# Patient Record
Sex: FEMALE
[De-identification: names, ages, dates, MRNs, and addresses within clinical notes are randomized per-mention and may not be internally consistent; named-entity substitution may affect disease eponyms.]

---

## 2017-02-02 ENCOUNTER — CHARTING TRANS (OUTPATIENT)
Dept: OTHER | Age: 47
End: 2017-02-02

## 2017-02-05 ENCOUNTER — CHARTING TRANS (OUTPATIENT)
Dept: OTHER | Age: 47
End: 2017-02-05

## 2017-11-08 ENCOUNTER — CHARTING TRANS (OUTPATIENT)
Dept: OTHER | Age: 47
End: 2017-11-08

## 2018-11-02 VITALS
OXYGEN SATURATION: 98 % | WEIGHT: 160 LBS | RESPIRATION RATE: 16 BRPM | SYSTOLIC BLOOD PRESSURE: 100 MMHG | DIASTOLIC BLOOD PRESSURE: 68 MMHG | HEIGHT: 65 IN | BODY MASS INDEX: 26.66 KG/M2 | HEART RATE: 68 BPM | TEMPERATURE: 98 F

## 2021-02-01 ENCOUNTER — WALK IN (OUTPATIENT)
Dept: URGENT CARE | Age: 51
End: 2021-02-01

## 2021-02-01 VITALS
TEMPERATURE: 96.2 F | SYSTOLIC BLOOD PRESSURE: 108 MMHG | DIASTOLIC BLOOD PRESSURE: 76 MMHG | WEIGHT: 164.79 LBS | HEART RATE: 76 BPM | BODY MASS INDEX: 27.46 KG/M2 | HEIGHT: 65 IN

## 2021-02-01 DIAGNOSIS — Z02.1 PHYSICAL EXAM, PRE-EMPLOYMENT: Primary | ICD-10-CM

## 2021-02-01 PROCEDURE — X0945 SELF PAY APN OR PA PERFORMED ADMINISTRATIVE PHYSICAL: HCPCS | Performed by: NURSE PRACTITIONER

## 2021-02-01 ASSESSMENT — ENCOUNTER SYMPTOMS
WHEEZING: 0
SHORTNESS OF BREATH: 0
HEADACHES: 0
EYE DISCHARGE: 0
FATIGUE: 0
DIARRHEA: 0
RHINORRHEA: 0
ABDOMINAL PAIN: 0
EYE PAIN: 0
DIAPHORESIS: 0
PSYCHIATRIC NEGATIVE: 1
SORE THROAT: 0
FEVER: 0
DIZZINESS: 0
CHEST TIGHTNESS: 0
WOUND: 0
COUGH: 0
CHILLS: 0
SEIZURES: 0
EYE ITCHING: 0
VOMITING: 0
SINUS PRESSURE: 0
NAUSEA: 0

## 2021-02-01 ASSESSMENT — VISUAL ACUITY: OU: 1

## 2021-03-09 ENCOUNTER — WALK IN (OUTPATIENT)
Dept: URGENT CARE | Age: 51
End: 2021-03-09

## 2021-03-09 VITALS — TEMPERATURE: 98.2 F

## 2021-03-09 DIAGNOSIS — Z11.1 SCREENING FOR TUBERCULOSIS: Primary | ICD-10-CM

## 2021-03-09 PROCEDURE — 86580 TB INTRADERMAL TEST: CPT | Performed by: NURSE PRACTITIONER

## 2021-03-11 ENCOUNTER — WALK IN (OUTPATIENT)
Dept: URGENT CARE | Age: 51
End: 2021-03-11

## 2021-03-11 DIAGNOSIS — Z11.1 ENCOUNTER FOR PPD SKIN TEST READING: Primary | ICD-10-CM

## 2021-03-11 LAB
INDURATION: 0 MM (ref 0–?)
SKIN TEST RESULT: NEGATIVE

## 2021-03-11 PROCEDURE — X1094 NO CHARGE VISIT: HCPCS | Performed by: NURSE PRACTITIONER

## 2022-11-09 ENCOUNTER — OFFICE VISIT (OUTPATIENT)
Dept: RHEUMATOLOGY | Facility: CLINIC | Age: 52
End: 2022-11-09
Payer: COMMERCIAL

## 2022-11-09 ENCOUNTER — LAB ENCOUNTER (OUTPATIENT)
Dept: LAB | Age: 52
End: 2022-11-09
Attending: INTERNAL MEDICINE
Payer: COMMERCIAL

## 2022-11-09 VITALS
DIASTOLIC BLOOD PRESSURE: 75 MMHG | SYSTOLIC BLOOD PRESSURE: 125 MMHG | HEART RATE: 93 BPM | TEMPERATURE: 98 F | HEIGHT: 65 IN | WEIGHT: 179 LBS | RESPIRATION RATE: 16 BRPM | OXYGEN SATURATION: 97 % | BODY MASS INDEX: 29.82 KG/M2

## 2022-11-09 DIAGNOSIS — R76.8 RHEUMATOID FACTOR POSITIVE: ICD-10-CM

## 2022-11-09 DIAGNOSIS — Z11.59 NEED FOR HEPATITIS C SCREENING TEST: ICD-10-CM

## 2022-11-09 DIAGNOSIS — M05.79 RHEUMATOID ARTHRITIS INVOLVING MULTIPLE SITES WITH POSITIVE RHEUMATOID FACTOR (HCC): ICD-10-CM

## 2022-11-09 DIAGNOSIS — Z11.59 NEED FOR HEPATITIS B SCREENING TEST: ICD-10-CM

## 2022-11-09 DIAGNOSIS — Z11.1 SCREENING FOR TUBERCULOSIS: ICD-10-CM

## 2022-11-09 DIAGNOSIS — M05.79 RHEUMATOID ARTHRITIS INVOLVING MULTIPLE SITES WITH POSITIVE RHEUMATOID FACTOR (HCC): Primary | ICD-10-CM

## 2022-11-09 DIAGNOSIS — Z51.81 THERAPEUTIC DRUG MONITORING: ICD-10-CM

## 2022-11-09 LAB
ALBUMIN SERPL-MCNC: 3.2 G/DL (ref 3.4–5)
ALBUMIN/GLOB SERPL: 0.8 {RATIO} (ref 1–2)
ALP LIVER SERPL-CCNC: 101 U/L
ALT SERPL-CCNC: 15 U/L
ANION GAP SERPL CALC-SCNC: 4 MMOL/L (ref 0–18)
AST SERPL-CCNC: 12 U/L (ref 15–37)
BASOPHILS # BLD AUTO: 0.05 X10(3) UL (ref 0–0.2)
BASOPHILS NFR BLD AUTO: 0.6 %
BILIRUB SERPL-MCNC: 0.4 MG/DL (ref 0.1–2)
BILIRUB UR QL STRIP.AUTO: NEGATIVE
BUN BLD-MCNC: 9 MG/DL (ref 7–18)
C3 SERPL-MCNC: 139 MG/DL (ref 90–180)
C4 SERPL-MCNC: 34.4 MG/DL (ref 10–40)
CALCIUM BLD-MCNC: 8.6 MG/DL (ref 8.5–10.1)
CHLORIDE SERPL-SCNC: 109 MMOL/L (ref 98–112)
CO2 SERPL-SCNC: 27 MMOL/L (ref 21–32)
CREAT BLD-MCNC: 0.6 MG/DL
CREAT UR-SCNC: 262 MG/DL
CRP SERPL-MCNC: 2.93 MG/DL (ref ?–0.3)
EOSINOPHIL # BLD AUTO: 0.07 X10(3) UL (ref 0–0.7)
EOSINOPHIL NFR BLD AUTO: 0.9 %
ERYTHROCYTE [DISTWIDTH] IN BLOOD BY AUTOMATED COUNT: 17 %
ERYTHROCYTE [SEDIMENTATION RATE] IN BLOOD: 75 MM/HR
FASTING STATUS PATIENT QL REPORTED: YES
GFR SERPLBLD BASED ON 1.73 SQ M-ARVRAT: 109 ML/MIN/1.73M2 (ref 60–?)
GLOBULIN PLAS-MCNC: 4.2 G/DL (ref 2.8–4.4)
GLUCOSE BLD-MCNC: 94 MG/DL (ref 70–99)
GLUCOSE UR STRIP.AUTO-MCNC: NEGATIVE MG/DL
HBV CORE AB SERPL QL IA: NONREACTIVE
HCT VFR BLD AUTO: 32.5 %
HGB BLD-MCNC: 10.2 G/DL
IMM GRANULOCYTES # BLD AUTO: 0.02 X10(3) UL (ref 0–1)
IMM GRANULOCYTES NFR BLD: 0.3 %
LYMPHOCYTES # BLD AUTO: 2.48 X10(3) UL (ref 1–4)
LYMPHOCYTES NFR BLD AUTO: 31.6 %
MCH RBC QN AUTO: 24.9 PG (ref 26–34)
MCHC RBC AUTO-ENTMCNC: 31.4 G/DL (ref 31–37)
MCV RBC AUTO: 79.3 FL
MONOCYTES # BLD AUTO: 0.7 X10(3) UL (ref 0.1–1)
MONOCYTES NFR BLD AUTO: 8.9 %
NEUTROPHILS # BLD AUTO: 4.54 X10 (3) UL (ref 1.5–7.7)
NEUTROPHILS # BLD AUTO: 4.54 X10(3) UL (ref 1.5–7.7)
NEUTROPHILS NFR BLD AUTO: 57.7 %
NITRITE UR QL STRIP.AUTO: POSITIVE
OSMOLALITY SERPL CALC.SUM OF ELEC: 288 MOSM/KG (ref 275–295)
PH UR STRIP.AUTO: 5 [PH] (ref 5–8)
PLATELET # BLD AUTO: 409 10(3)UL (ref 150–450)
POTASSIUM SERPL-SCNC: 4 MMOL/L (ref 3.5–5.1)
PROT SERPL-MCNC: 7.4 G/DL (ref 6.4–8.2)
PROT UR STRIP.AUTO-MCNC: 100 MG/DL
PROT UR-MCNC: 37.1 MG/DL
RBC # BLD AUTO: 4.1 X10(6)UL
SODIUM SERPL-SCNC: 140 MMOL/L (ref 136–145)
SP GR UR STRIP.AUTO: 1.03 (ref 1–1.03)
URATE SERPL-MCNC: 2.9 MG/DL
UROBILINOGEN UR STRIP.AUTO-MCNC: <2 MG/DL
VIT B12 SERPL-MCNC: 206 PG/ML (ref 193–986)
WBC # BLD AUTO: 7.9 X10(3) UL (ref 4–11)

## 2022-11-09 PROCEDURE — 87186 SC STD MICRODIL/AGAR DIL: CPT

## 2022-11-09 PROCEDURE — 85613 RUSSELL VIPER VENOM DILUTED: CPT

## 2022-11-09 PROCEDURE — 86039 ANTINUCLEAR ANTIBODIES (ANA): CPT

## 2022-11-09 PROCEDURE — 86480 TB TEST CELL IMMUN MEASURE: CPT

## 2022-11-09 PROCEDURE — 84550 ASSAY OF BLOOD/URIC ACID: CPT

## 2022-11-09 PROCEDURE — 99245 OFF/OP CONSLTJ NEW/EST HI 55: CPT | Performed by: INTERNAL MEDICINE

## 2022-11-09 PROCEDURE — 3074F SYST BP LT 130 MM HG: CPT | Performed by: INTERNAL MEDICINE

## 2022-11-09 PROCEDURE — 80053 COMPREHEN METABOLIC PANEL: CPT

## 2022-11-09 PROCEDURE — 86038 ANTINUCLEAR ANTIBODIES: CPT

## 2022-11-09 PROCEDURE — 85732 THROMBOPLASTIN TIME PARTIAL: CPT

## 2022-11-09 PROCEDURE — 87088 URINE BACTERIA CULTURE: CPT

## 2022-11-09 PROCEDURE — 86140 C-REACTIVE PROTEIN: CPT

## 2022-11-09 PROCEDURE — 86147 CARDIOLIPIN ANTIBODY EA IG: CPT

## 2022-11-09 PROCEDURE — 82570 ASSAY OF URINE CREATININE: CPT

## 2022-11-09 PROCEDURE — 86200 CCP ANTIBODY: CPT

## 2022-11-09 PROCEDURE — 85025 COMPLETE CBC W/AUTO DIFF WBC: CPT

## 2022-11-09 PROCEDURE — 3078F DIAST BP <80 MM HG: CPT | Performed by: INTERNAL MEDICINE

## 2022-11-09 PROCEDURE — 86160 COMPLEMENT ANTIGEN: CPT

## 2022-11-09 PROCEDURE — 86704 HEP B CORE ANTIBODY TOTAL: CPT

## 2022-11-09 PROCEDURE — 87086 URINE CULTURE/COLONY COUNT: CPT

## 2022-11-09 PROCEDURE — 84156 ASSAY OF PROTEIN URINE: CPT

## 2022-11-09 PROCEDURE — 3008F BODY MASS INDEX DOCD: CPT | Performed by: INTERNAL MEDICINE

## 2022-11-09 PROCEDURE — 86146 BETA-2 GLYCOPROTEIN ANTIBODY: CPT

## 2022-11-09 PROCEDURE — 82607 VITAMIN B-12: CPT

## 2022-11-09 PROCEDURE — 85610 PROTHROMBIN TIME: CPT

## 2022-11-09 PROCEDURE — 81001 URINALYSIS AUTO W/SCOPE: CPT

## 2022-11-09 PROCEDURE — 36415 COLL VENOUS BLD VENIPUNCTURE: CPT

## 2022-11-09 PROCEDURE — 85652 RBC SED RATE AUTOMATED: CPT

## 2022-11-09 PROCEDURE — 85705 THROMBOPLASTIN INHIBITION: CPT

## 2022-11-09 RX ORDER — METHOTREXATE 2.5 MG/1
TABLET ORAL
Qty: 50 TABLET | Refills: 0 | Status: SHIPPED | OUTPATIENT
Start: 2022-11-09 | End: 2023-01-12

## 2022-11-09 RX ORDER — HYDROXYCHLOROQUINE SULFATE 200 MG/1
200 TABLET, FILM COATED ORAL 2 TIMES DAILY
Qty: 180 TABLET | Refills: 1 | Status: SHIPPED | OUTPATIENT
Start: 2022-11-09

## 2022-11-09 RX ORDER — HYDROXYCHLOROQUINE SULFATE 200 MG/1
TABLET, FILM COATED ORAL
COMMUNITY
Start: 2022-09-06 | End: 2022-11-09

## 2022-11-09 RX ORDER — PREDNISONE 2.5 MG
TABLET ORAL DAILY
COMMUNITY
Start: 2022-09-13

## 2022-11-09 RX ORDER — PREDNISONE 1 MG/1
TABLET ORAL
Qty: 125 TABLET | Refills: 0 | Status: SHIPPED | OUTPATIENT
Start: 2022-11-09 | End: 2023-02-07

## 2022-11-09 RX ORDER — FOLIC ACID 1 MG/1
1 TABLET ORAL DAILY
Qty: 90 TABLET | Refills: 3 | Status: SHIPPED | OUTPATIENT
Start: 2022-11-09

## 2022-11-09 RX ORDER — ALPRAZOLAM 0.25 MG/1
0.25 TABLET ORAL NIGHTLY PRN
COMMUNITY

## 2022-11-10 LAB — NUCLEAR IGG TITR SER IF: POSITIVE {TITER}

## 2022-11-11 ENCOUNTER — TELEPHONE (OUTPATIENT)
Dept: RHEUMATOLOGY | Facility: CLINIC | Age: 52
End: 2022-11-11

## 2022-11-11 DIAGNOSIS — N39.0 URINARY TRACT INFECTION WITH HEMATURIA, SITE UNSPECIFIED: Primary | ICD-10-CM

## 2022-11-11 DIAGNOSIS — R31.9 URINARY TRACT INFECTION WITH HEMATURIA, SITE UNSPECIFIED: Primary | ICD-10-CM

## 2022-11-11 LAB
ANA NUCLEOLAR TITR SER IF: 640 {TITER}
APTT PPP: 27.7 SECONDS (ref 23.3–35.6)
B2 GLYCOPROT1 IGG SERPL IA-ACNC: <0.8 U/ML (ref ?–7)
B2 GLYCOPROT1 IGM SERPL IA-ACNC: <2.4 U/ML (ref ?–7)
CARDIOLIPIN IGG SERPL-ACNC: 0.9 GPL (ref ?–10)
CARDIOLIPIN IGM SERPL-ACNC: 1.1 MPL (ref ?–10)
CCP IGG SERPL-ACNC: 228 U/ML (ref 0–6.9)
LA 3 SCREEN W REFLEX-IMP: NEGATIVE
M TB IFN-G CD4+ T-CELLS BLD-ACNC: 0.04 IU/ML
M TB TUBERC IFN-G BLD QL: NEGATIVE
M TB TUBERC IGNF/MITOGEN IGNF CONTROL: >10 IU/ML
PROTHROMBIN TIME: 12.4 SECONDS (ref 11.6–14.8)
QFT TB1 AG MINUS NIL: 0 IU/ML
QFT TB2 AG MINUS NIL: -0.01 IU/ML
SCREEN DRVVT: 1.02 S (ref 0–1.29)
SCREEN DRVVT: NEGATIVE S
STACLOT LA DELTA: 4.3 SECONDS (ref ?–8)

## 2022-11-11 RX ORDER — SULFAMETHOXAZOLE AND TRIMETHOPRIM 800; 160 MG/1; MG/1
1 TABLET ORAL 2 TIMES DAILY
Qty: 6 TABLET | Refills: 0 | Status: SHIPPED | OUTPATIENT
Start: 2022-11-11 | End: 2022-11-14

## 2022-11-11 NOTE — TELEPHONE ENCOUNTER
Please call patient, urine culture consistent with UTI from E. coli. Start Bactrim double strength tab 1 tab twice daily for 3 days. Hold off on starting methotrexate until finished with antibiotic.

## 2022-11-11 NOTE — TELEPHONE ENCOUNTER
Pt spouse called off, notified of test results per Dr. Breanna Hart. \"urine culture consistent with UTI from E. coli. Start Bactrim double strength tab 1 tab twice daily for 3 days. Hold off on starting methotrexate until finished with antibiotic\"  Voiced understanding, will call office with questions or concerns.

## 2022-11-13 ENCOUNTER — TELEPHONE (OUTPATIENT)
Dept: RHEUMATOLOGY | Facility: CLINIC | Age: 52
End: 2022-11-13

## 2022-11-13 DIAGNOSIS — M05.79 RHEUMATOID ARTHRITIS INVOLVING MULTIPLE SITES WITH POSITIVE RHEUMATOID FACTOR (HCC): Primary | ICD-10-CM

## 2022-11-13 DIAGNOSIS — Z51.81 THERAPEUTIC DRUG MONITORING: ICD-10-CM

## 2022-11-14 ENCOUNTER — TELEPHONE (OUTPATIENT)
Dept: RHEUMATOLOGY | Facility: CLINIC | Age: 52
End: 2022-11-14

## 2022-11-14 DIAGNOSIS — R31.9 URINARY TRACT INFECTION WITH HEMATURIA, SITE UNSPECIFIED: ICD-10-CM

## 2022-11-14 DIAGNOSIS — N39.0 URINARY TRACT INFECTION WITH HEMATURIA, SITE UNSPECIFIED: ICD-10-CM

## 2022-11-14 RX ORDER — SULFAMETHOXAZOLE AND TRIMETHOPRIM 800; 160 MG/1; MG/1
1 TABLET ORAL 2 TIMES DAILY
Qty: 6 TABLET | Refills: 0 | Status: SHIPPED | OUTPATIENT
Start: 2022-11-14

## 2022-11-14 NOTE — TELEPHONE ENCOUNTER
Spoke with pt spouse, requested prescription gets sent to [facility] in [place]. Advised to hold methotrexate until antibiotics completed.  Verbalized understanigng

## 2022-12-07 ENCOUNTER — TELEPHONE (OUTPATIENT)
Dept: RHEUMATOLOGY | Facility: CLINIC | Age: 52
End: 2022-12-07

## 2022-12-07 DIAGNOSIS — M05.79 RHEUMATOID ARTHRITIS INVOLVING MULTIPLE SITES WITH POSITIVE RHEUMATOID FACTOR (HCC): ICD-10-CM

## 2022-12-07 DIAGNOSIS — Z51.81 THERAPEUTIC DRUG MONITORING: ICD-10-CM

## 2022-12-07 NOTE — TELEPHONE ENCOUNTER
Please call pt and see if any side effects or issues with taking methotrexate. Remind her to repeat labs: comprehensive metabolic panel (CMP) and complete blood count with differential (CBC w/ diff) in 1 week.

## 2022-12-07 NOTE — TELEPHONE ENCOUNTER
called pt, pt denies any side effects and is tolerating methotrexate 6 tabs weekly. Reminded pt to repeat labs in 1wk, number to CS given --comprehensive metabolic panel (CMP) and complete blood count with differential (CBC w/ diff)  Pt voiced understanding.

## 2022-12-13 ENCOUNTER — TELEPHONE (OUTPATIENT)
Dept: RHEUMATOLOGY | Facility: CLINIC | Age: 52
End: 2022-12-13

## 2022-12-13 ENCOUNTER — LAB ENCOUNTER (OUTPATIENT)
Dept: LAB | Age: 52
End: 2022-12-13
Attending: INTERNAL MEDICINE
Payer: COMMERCIAL

## 2022-12-13 DIAGNOSIS — Z51.81 THERAPEUTIC DRUG MONITORING: ICD-10-CM

## 2022-12-13 DIAGNOSIS — M05.79 RHEUMATOID ARTHRITIS INVOLVING MULTIPLE SITES WITH POSITIVE RHEUMATOID FACTOR (HCC): ICD-10-CM

## 2022-12-13 DIAGNOSIS — E53.8 LOW VITAMIN B12 LEVEL: ICD-10-CM

## 2022-12-13 DIAGNOSIS — M05.79 RHEUMATOID ARTHRITIS INVOLVING MULTIPLE SITES WITH POSITIVE RHEUMATOID FACTOR (HCC): Primary | ICD-10-CM

## 2022-12-13 LAB
ALBUMIN SERPL-MCNC: 3.3 G/DL (ref 3.4–5)
ALBUMIN/GLOB SERPL: 0.8 {RATIO} (ref 1–2)
ALP LIVER SERPL-CCNC: 87 U/L
ALT SERPL-CCNC: 19 U/L
ANION GAP SERPL CALC-SCNC: 9 MMOL/L (ref 0–18)
AST SERPL-CCNC: 17 U/L (ref 15–37)
BASOPHILS # BLD AUTO: 0.04 X10(3) UL (ref 0–0.2)
BASOPHILS NFR BLD AUTO: 0.7 %
BILIRUB SERPL-MCNC: 0.4 MG/DL (ref 0.1–2)
BUN BLD-MCNC: 6 MG/DL (ref 7–18)
CALCIUM BLD-MCNC: 9 MG/DL (ref 8.5–10.1)
CHLORIDE SERPL-SCNC: 109 MMOL/L (ref 98–112)
CO2 SERPL-SCNC: 24 MMOL/L (ref 21–32)
CREAT BLD-MCNC: 0.6 MG/DL
EOSINOPHIL # BLD AUTO: 0.09 X10(3) UL (ref 0–0.7)
EOSINOPHIL NFR BLD AUTO: 1.7 %
ERYTHROCYTE [DISTWIDTH] IN BLOOD BY AUTOMATED COUNT: 19.2 %
FASTING STATUS PATIENT QL REPORTED: NO
GFR SERPLBLD BASED ON 1.73 SQ M-ARVRAT: 109 ML/MIN/1.73M2 (ref 60–?)
GLOBULIN PLAS-MCNC: 4 G/DL (ref 2.8–4.4)
GLUCOSE BLD-MCNC: 99 MG/DL (ref 70–99)
HCT VFR BLD AUTO: 34.8 %
HGB BLD-MCNC: 10.8 G/DL
IMM GRANULOCYTES # BLD AUTO: 0.01 X10(3) UL (ref 0–1)
IMM GRANULOCYTES NFR BLD: 0.2 %
LYMPHOCYTES # BLD AUTO: 1.96 X10(3) UL (ref 1–4)
LYMPHOCYTES NFR BLD AUTO: 36 %
MCH RBC QN AUTO: 25.5 PG (ref 26–34)
MCHC RBC AUTO-ENTMCNC: 31 G/DL (ref 31–37)
MCV RBC AUTO: 82.1 FL
MONOCYTES # BLD AUTO: 0.39 X10(3) UL (ref 0.1–1)
MONOCYTES NFR BLD AUTO: 7.2 %
NEUTROPHILS # BLD AUTO: 2.95 X10 (3) UL (ref 1.5–7.7)
NEUTROPHILS # BLD AUTO: 2.95 X10(3) UL (ref 1.5–7.7)
NEUTROPHILS NFR BLD AUTO: 54.2 %
OSMOLALITY SERPL CALC.SUM OF ELEC: 292 MOSM/KG (ref 275–295)
PLATELET # BLD AUTO: 365 10(3)UL (ref 150–450)
POTASSIUM SERPL-SCNC: 3.9 MMOL/L (ref 3.5–5.1)
PROT SERPL-MCNC: 7.3 G/DL (ref 6.4–8.2)
RBC # BLD AUTO: 4.24 X10(6)UL
SODIUM SERPL-SCNC: 142 MMOL/L (ref 136–145)
WBC # BLD AUTO: 5.4 X10(3) UL (ref 4–11)

## 2022-12-13 PROCEDURE — 85025 COMPLETE CBC W/AUTO DIFF WBC: CPT | Performed by: INTERNAL MEDICINE

## 2022-12-13 PROCEDURE — 80053 COMPREHEN METABOLIC PANEL: CPT | Performed by: INTERNAL MEDICINE

## 2022-12-13 RX ORDER — METHOTREXATE 2.5 MG/1
20 TABLET ORAL WEEKLY
Qty: 104 TABLET | Refills: 0 | Status: SHIPPED | OUTPATIENT
Start: 2022-12-13 | End: 2022-12-14

## 2022-12-14 RX ORDER — METHOTREXATE 2.5 MG/1
20 TABLET ORAL WEEKLY
Qty: 104 TABLET | Refills: 0 | Status: SHIPPED | OUTPATIENT
Start: 2022-12-14 | End: 2023-03-15

## 2023-01-12 ENCOUNTER — TELEPHONE (OUTPATIENT)
Dept: RHEUMATOLOGY | Facility: CLINIC | Age: 53
End: 2023-01-12

## 2023-01-12 NOTE — TELEPHONE ENCOUNTER
Pt phoned office, states she received a call from our office. No message noted, reminded pt Dr. Ephraim Gibson requested blood work mid January to monitor for toxicity of medication. Pt voiced understanding.

## 2023-01-17 ENCOUNTER — LAB ENCOUNTER (OUTPATIENT)
Dept: LAB | Age: 53
End: 2023-01-17
Attending: INTERNAL MEDICINE
Payer: COMMERCIAL

## 2023-01-17 DIAGNOSIS — M05.79 RHEUMATOID ARTHRITIS INVOLVING MULTIPLE SITES WITH POSITIVE RHEUMATOID FACTOR (HCC): ICD-10-CM

## 2023-01-17 LAB
ALBUMIN SERPL-MCNC: 3.6 G/DL (ref 3.4–5)
ALBUMIN/GLOB SERPL: 0.9 {RATIO} (ref 1–2)
ALP LIVER SERPL-CCNC: 94 U/L
ALT SERPL-CCNC: 19 U/L
ANION GAP SERPL CALC-SCNC: 3 MMOL/L (ref 0–18)
AST SERPL-CCNC: 16 U/L (ref 15–37)
BASOPHILS # BLD AUTO: 0.04 X10(3) UL (ref 0–0.2)
BASOPHILS NFR BLD AUTO: 0.6 %
BILIRUB SERPL-MCNC: 0.4 MG/DL (ref 0.1–2)
BUN BLD-MCNC: 9 MG/DL (ref 7–18)
CALCIUM BLD-MCNC: 9 MG/DL (ref 8.5–10.1)
CHLORIDE SERPL-SCNC: 106 MMOL/L (ref 98–112)
CO2 SERPL-SCNC: 28 MMOL/L (ref 21–32)
CREAT BLD-MCNC: 0.67 MG/DL
EOSINOPHIL # BLD AUTO: 0.08 X10(3) UL (ref 0–0.7)
EOSINOPHIL NFR BLD AUTO: 1.3 %
ERYTHROCYTE [DISTWIDTH] IN BLOOD BY AUTOMATED COUNT: 20.6 %
FASTING STATUS PATIENT QL REPORTED: NO
GFR SERPLBLD BASED ON 1.73 SQ M-ARVRAT: 106 ML/MIN/1.73M2 (ref 60–?)
GLOBULIN PLAS-MCNC: 3.9 G/DL (ref 2.8–4.4)
GLUCOSE BLD-MCNC: 99 MG/DL (ref 70–99)
HCT VFR BLD AUTO: 35.4 %
HGB BLD-MCNC: 11 G/DL
IMM GRANULOCYTES # BLD AUTO: 0.02 X10(3) UL (ref 0–1)
IMM GRANULOCYTES NFR BLD: 0.3 %
LYMPHOCYTES # BLD AUTO: 2.2 X10(3) UL (ref 1–4)
LYMPHOCYTES NFR BLD AUTO: 34.5 %
MCH RBC QN AUTO: 25.9 PG (ref 26–34)
MCHC RBC AUTO-ENTMCNC: 31.1 G/DL (ref 31–37)
MCV RBC AUTO: 83.5 FL
MONOCYTES # BLD AUTO: 0.45 X10(3) UL (ref 0.1–1)
MONOCYTES NFR BLD AUTO: 7.1 %
NEUTROPHILS # BLD AUTO: 3.58 X10 (3) UL (ref 1.5–7.7)
NEUTROPHILS # BLD AUTO: 3.58 X10(3) UL (ref 1.5–7.7)
NEUTROPHILS NFR BLD AUTO: 56.2 %
OSMOLALITY SERPL CALC.SUM OF ELEC: 283 MOSM/KG (ref 275–295)
PLATELET # BLD AUTO: 332 10(3)UL (ref 150–450)
POTASSIUM SERPL-SCNC: 3.8 MMOL/L (ref 3.5–5.1)
PROT SERPL-MCNC: 7.5 G/DL (ref 6.4–8.2)
RBC # BLD AUTO: 4.24 X10(6)UL
SODIUM SERPL-SCNC: 137 MMOL/L (ref 136–145)
WBC # BLD AUTO: 6.4 X10(3) UL (ref 4–11)

## 2023-01-17 PROCEDURE — 85025 COMPLETE CBC W/AUTO DIFF WBC: CPT | Performed by: INTERNAL MEDICINE

## 2023-01-17 PROCEDURE — 80053 COMPREHEN METABOLIC PANEL: CPT | Performed by: INTERNAL MEDICINE

## 2023-01-19 ENCOUNTER — TELEPHONE (OUTPATIENT)
Dept: RHEUMATOLOGY | Facility: CLINIC | Age: 53
End: 2023-01-19

## 2023-01-19 DIAGNOSIS — R76.8 RHEUMATOID FACTOR POSITIVE: ICD-10-CM

## 2023-01-19 DIAGNOSIS — Z11.1 SCREENING FOR TUBERCULOSIS: ICD-10-CM

## 2023-01-19 DIAGNOSIS — Z11.59 NEED FOR HEPATITIS B SCREENING TEST: ICD-10-CM

## 2023-01-19 DIAGNOSIS — Z51.81 THERAPEUTIC DRUG MONITORING: ICD-10-CM

## 2023-01-19 DIAGNOSIS — M05.79 RHEUMATOID ARTHRITIS INVOLVING MULTIPLE SITES WITH POSITIVE RHEUMATOID FACTOR (HCC): ICD-10-CM

## 2023-01-19 DIAGNOSIS — Z11.59 NEED FOR HEPATITIS C SCREENING TEST: ICD-10-CM

## 2023-01-19 RX ORDER — PREDNISONE 1 MG/1
5 TABLET ORAL DAILY
Qty: 30 TABLET | Refills: 0 | Status: SHIPPED | OUTPATIENT
Start: 2023-01-19 | End: 2023-02-18

## 2023-01-19 NOTE — TELEPHONE ENCOUNTER
Pt called office,looking for test results. Explained test results through Dr. Marvin Doherty message to pt. Voiced understanding.

## 2023-01-24 ENCOUNTER — TELEPHONE (OUTPATIENT)
Dept: RHEUMATOLOGY | Facility: CLINIC | Age: 53
End: 2023-01-24

## 2023-01-24 ENCOUNTER — PATIENT MESSAGE (OUTPATIENT)
Dept: RHEUMATOLOGY | Facility: CLINIC | Age: 53
End: 2023-01-24

## 2023-01-24 DIAGNOSIS — M05.79 RHEUMATOID ARTHRITIS INVOLVING MULTIPLE SITES WITH POSITIVE RHEUMATOID FACTOR (HCC): Primary | ICD-10-CM

## 2023-01-24 RX ORDER — PREDNISONE 1 MG/1
5 TABLET ORAL
Qty: 450 TABLET | Refills: 0 | Status: SHIPPED | OUTPATIENT
Start: 2023-01-24

## 2023-01-24 NOTE — TELEPHONE ENCOUNTER
Pt called office, feels she is gaining too much weight on prednisone, would like to wean off prednisone

## 2023-01-24 NOTE — TELEPHONE ENCOUNTER
From: Jennifer Come  To: Dennis Peck MD  Sent: 1/24/2023 7:55 AM CST  Subject: Question regarding Rosalea Awkward    Can I stop taking the Prednisone because I got too much Weights 15 Pounds more can you please give me another one without Cortizone  Thank you

## 2023-02-02 ENCOUNTER — TELEPHONE (OUTPATIENT)
Dept: RHEUMATOLOGY | Facility: CLINIC | Age: 53
End: 2023-02-02

## 2023-02-03 ENCOUNTER — TELEPHONE (OUTPATIENT)
Dept: RHEUMATOLOGY | Facility: CLINIC | Age: 53
End: 2023-02-03

## 2023-02-03 NOTE — TELEPHONE ENCOUNTER
Regarding: FW: Question regarding CBC W/ DIFFERENTIAL    ----- Message -----  From: Juju Mendez  Sent: 2/2/2023   7:30 PM CST  To: [facility]  Subject: Question regarding CBC W/ DIFFERENTIAL           My weight is 180 pounds  My highest is 5.5

## 2023-02-09 ENCOUNTER — OFFICE VISIT (OUTPATIENT)
Dept: RHEUMATOLOGY | Facility: CLINIC | Age: 53
End: 2023-02-09
Payer: COMMERCIAL

## 2023-02-09 VITALS
HEART RATE: 97 BPM | WEIGHT: 183.81 LBS | SYSTOLIC BLOOD PRESSURE: 120 MMHG | BODY MASS INDEX: 30.62 KG/M2 | OXYGEN SATURATION: 98 % | TEMPERATURE: 97 F | DIASTOLIC BLOOD PRESSURE: 80 MMHG | HEIGHT: 65 IN | RESPIRATION RATE: 16 BRPM

## 2023-02-09 DIAGNOSIS — M05.79 RHEUMATOID ARTHRITIS INVOLVING MULTIPLE SITES WITH POSITIVE RHEUMATOID FACTOR (HCC): ICD-10-CM

## 2023-02-09 DIAGNOSIS — Z51.81 THERAPEUTIC DRUG MONITORING: Primary | ICD-10-CM

## 2023-02-09 PROCEDURE — 3008F BODY MASS INDEX DOCD: CPT | Performed by: INTERNAL MEDICINE

## 2023-02-09 PROCEDURE — 3079F DIAST BP 80-89 MM HG: CPT | Performed by: INTERNAL MEDICINE

## 2023-02-09 PROCEDURE — 99214 OFFICE O/P EST MOD 30 MIN: CPT | Performed by: INTERNAL MEDICINE

## 2023-02-09 PROCEDURE — 3074F SYST BP LT 130 MM HG: CPT | Performed by: INTERNAL MEDICINE

## 2023-02-09 RX ORDER — HYDROXYCHLOROQUINE SULFATE 200 MG/1
200 TABLET, FILM COATED ORAL 2 TIMES DAILY
Qty: 180 TABLET | Refills: 1 | Status: SHIPPED | OUTPATIENT
Start: 2023-02-09

## 2023-02-09 RX ORDER — FOLIC ACID 1 MG/1
1 TABLET ORAL DAILY
Qty: 90 TABLET | Refills: 3 | Status: SHIPPED | OUTPATIENT
Start: 2023-02-09

## 2023-02-09 RX ORDER — METHOTREXATE 2.5 MG/1
20 TABLET ORAL WEEKLY
Qty: 104 TABLET | Refills: 0 | Status: SHIPPED | OUTPATIENT
Start: 2023-02-09 | End: 2023-05-11

## 2023-02-09 NOTE — PATIENT INSTRUCTIONS
-continue prednisone 2 mg for another week, then take 1 mg daily for 2 weeks then stop.  -continue methotrexate 20 mg (8 tablets) weekly  -continue folic acid daily  -continue hydroxychloroquine (plaquenil) 200 mg twice daily. -repeat monitoring blood work for methotrexate in late March 2023. Vaccinations needed given your RA and methotrexate therapy.   -Strep Pneumonia vaccines: Prevnar 20  Shingles vaccine: 1 dose, then another dose 3 months later.

## 2023-02-15 ENCOUNTER — TELEPHONE (OUTPATIENT)
Dept: RHEUMATOLOGY | Facility: CLINIC | Age: 53
End: 2023-02-15

## 2023-02-15 NOTE — TELEPHONE ENCOUNTER
Pt called office, requesting we call weight loss clinic for sooner appt time. Pt scheduled in May. Called Wt loss clinic, no available appt time, pt added to cancellation list. Pt notified of above.

## 2023-03-08 ENCOUNTER — TELEPHONE (OUTPATIENT)
Dept: RHEUMATOLOGY | Facility: CLINIC | Age: 53
End: 2023-03-08

## 2023-03-08 DIAGNOSIS — M05.79 RHEUMATOID ARTHRITIS INVOLVING MULTIPLE SITES WITH POSITIVE RHEUMATOID FACTOR (HCC): Primary | ICD-10-CM

## 2023-03-08 RX ORDER — PREDNISONE 2.5 MG
TABLET ORAL
Qty: 80 TABLET | Refills: 0 | Status: SHIPPED | OUTPATIENT
Start: 2023-03-08 | End: 2023-05-20

## 2023-03-14 DIAGNOSIS — E53.8 LOW VITAMIN B12 LEVEL: ICD-10-CM

## 2023-03-14 DIAGNOSIS — Z51.81 THERAPEUTIC DRUG MONITORING: ICD-10-CM

## 2023-03-14 DIAGNOSIS — M05.79 RHEUMATOID ARTHRITIS INVOLVING MULTIPLE SITES WITH POSITIVE RHEUMATOID FACTOR (HCC): ICD-10-CM

## 2023-03-15 ENCOUNTER — OFFICE VISIT (OUTPATIENT)
Dept: OBGYN CLINIC | Facility: CLINIC | Age: 53
End: 2023-03-15
Payer: COMMERCIAL

## 2023-03-15 VITALS
BODY MASS INDEX: 31.05 KG/M2 | HEIGHT: 63.75 IN | WEIGHT: 179.63 LBS | DIASTOLIC BLOOD PRESSURE: 78 MMHG | HEART RATE: 78 BPM | SYSTOLIC BLOOD PRESSURE: 114 MMHG

## 2023-03-15 DIAGNOSIS — Z12.4 SCREENING FOR CERVICAL CANCER: ICD-10-CM

## 2023-03-15 DIAGNOSIS — N89.8 VAGINAL ODOR: ICD-10-CM

## 2023-03-15 DIAGNOSIS — R30.0 BURNING WITH URINATION: ICD-10-CM

## 2023-03-15 DIAGNOSIS — Z01.419 WELL WOMAN EXAM WITH ROUTINE GYNECOLOGICAL EXAM: Primary | ICD-10-CM

## 2023-03-15 DIAGNOSIS — N95.1 PERIMENOPAUSAL SYMPTOMS: ICD-10-CM

## 2023-03-15 DIAGNOSIS — Z11.51 SCREENING FOR HUMAN PAPILLOMAVIRUS (HPV): ICD-10-CM

## 2023-03-15 DIAGNOSIS — Z12.31 ENCOUNTER FOR SCREENING MAMMOGRAM FOR MALIGNANT NEOPLASM OF BREAST: ICD-10-CM

## 2023-03-15 PROCEDURE — 87510 GARDNER VAG DNA DIR PROBE: CPT | Performed by: NURSE PRACTITIONER

## 2023-03-15 PROCEDURE — 99213 OFFICE O/P EST LOW 20 MIN: CPT | Performed by: NURSE PRACTITIONER

## 2023-03-15 PROCEDURE — 99386 PREV VISIT NEW AGE 40-64: CPT | Performed by: NURSE PRACTITIONER

## 2023-03-15 PROCEDURE — 87624 HPV HI-RISK TYP POOLED RSLT: CPT | Performed by: NURSE PRACTITIONER

## 2023-03-15 PROCEDURE — 3008F BODY MASS INDEX DOCD: CPT | Performed by: NURSE PRACTITIONER

## 2023-03-15 PROCEDURE — 87480 CANDIDA DNA DIR PROBE: CPT | Performed by: NURSE PRACTITIONER

## 2023-03-15 PROCEDURE — 3078F DIAST BP <80 MM HG: CPT | Performed by: NURSE PRACTITIONER

## 2023-03-15 PROCEDURE — 87660 TRICHOMONAS VAGIN DIR PROBE: CPT | Performed by: NURSE PRACTITIONER

## 2023-03-15 PROCEDURE — 87086 URINE CULTURE/COLONY COUNT: CPT | Performed by: NURSE PRACTITIONER

## 2023-03-15 PROCEDURE — 3074F SYST BP LT 130 MM HG: CPT | Performed by: NURSE PRACTITIONER

## 2023-03-15 RX ORDER — HYDROXYCHLOROQUINE SULFATE 200 MG/1
200 TABLET, FILM COATED ORAL 2 TIMES DAILY
Qty: 180 TABLET | Refills: 1 | Status: SHIPPED | OUTPATIENT
Start: 2023-03-15

## 2023-03-15 NOTE — TELEPHONE ENCOUNTER
Future Appointments   Date Time Provider Dimple Breanna   3/15/2023  3:00 PM ZHOU Zepeda [facility] [address]   3/27/2023 11:45 AM REF [facility] REF OS Ref [address]   5/3/2023 12:20 PM Nancy Thomas PA-C [address] [facility] [address]   5/26/2023  1:00 PM Marylen Lusher, MD [facility] [place]     LOV  2/9/2023    Last refill     Vitamin B 12  90 tabs, 3 refills

## 2023-03-16 LAB — HPV I/H RISK 1 DNA SPEC QL NAA+PROBE: NEGATIVE

## 2023-03-20 ENCOUNTER — PATIENT MESSAGE (OUTPATIENT)
Dept: RHEUMATOLOGY | Facility: CLINIC | Age: 53
End: 2023-03-20

## 2023-03-21 ENCOUNTER — HOSPITAL ENCOUNTER (OUTPATIENT)
Dept: MAMMOGRAPHY | Facility: HOSPITAL | Age: 53
Discharge: HOME | End: 2023-03-21
Attending: NURSE PRACTITIONER
Payer: COMMERCIAL

## 2023-03-21 DIAGNOSIS — Z12.31 ENCOUNTER FOR SCREENING MAMMOGRAM FOR MALIGNANT NEOPLASM OF BREAST: ICD-10-CM

## 2023-03-21 PROCEDURE — 77067 SCR MAMMO BI INCL CAD: CPT | Performed by: NURSE PRACTITIONER

## 2023-03-21 PROCEDURE — 77063 BREAST TOMOSYNTHESIS BI: CPT | Performed by: NURSE PRACTITIONER

## 2023-03-27 ENCOUNTER — LAB ENCOUNTER (OUTPATIENT)
Dept: LAB | Age: 53
End: 2023-03-27
Attending: INTERNAL MEDICINE
Payer: COMMERCIAL

## 2023-03-27 ENCOUNTER — TELEPHONE (OUTPATIENT)
Dept: RHEUMATOLOGY | Facility: CLINIC | Age: 53
End: 2023-03-27

## 2023-03-27 DIAGNOSIS — M05.79 RHEUMATOID ARTHRITIS INVOLVING MULTIPLE SITES WITH POSITIVE RHEUMATOID FACTOR (HCC): ICD-10-CM

## 2023-03-27 DIAGNOSIS — D64.9 ANEMIA, UNSPECIFIED TYPE: ICD-10-CM

## 2023-03-27 DIAGNOSIS — Z51.81 THERAPEUTIC DRUG MONITORING: ICD-10-CM

## 2023-03-27 DIAGNOSIS — D64.9 ANEMIA, UNSPECIFIED TYPE: Primary | ICD-10-CM

## 2023-03-27 LAB
ALBUMIN SERPL-MCNC: 3.5 G/DL (ref 3.4–5)
ALBUMIN/GLOB SERPL: 0.9 {RATIO} (ref 1–2)
ALP LIVER SERPL-CCNC: 82 U/L
ALT SERPL-CCNC: 21 U/L
ANION GAP SERPL CALC-SCNC: 4 MMOL/L (ref 0–18)
AST SERPL-CCNC: 3 U/L (ref 15–37)
BASOPHILS # BLD AUTO: 0.03 X10(3) UL (ref 0–0.2)
BASOPHILS NFR BLD AUTO: 0.5 %
BILIRUB SERPL-MCNC: 0.3 MG/DL (ref 0.1–2)
BUN BLD-MCNC: 8 MG/DL (ref 7–18)
CALCIUM BLD-MCNC: 9.4 MG/DL (ref 8.5–10.1)
CHLORIDE SERPL-SCNC: 113 MMOL/L (ref 98–112)
CO2 SERPL-SCNC: 24 MMOL/L (ref 21–32)
CREAT BLD-MCNC: 0.72 MG/DL
EOSINOPHIL # BLD AUTO: 0.02 X10(3) UL (ref 0–0.7)
EOSINOPHIL NFR BLD AUTO: 0.3 %
ERYTHROCYTE [DISTWIDTH] IN BLOOD BY AUTOMATED COUNT: 17.4 %
FASTING STATUS PATIENT QL REPORTED: YES
GFR SERPLBLD BASED ON 1.73 SQ M-ARVRAT: 101 ML/MIN/1.73M2 (ref 60–?)
GLOBULIN PLAS-MCNC: 4 G/DL (ref 2.8–4.4)
GLUCOSE BLD-MCNC: 121 MG/DL (ref 70–99)
HCT VFR BLD AUTO: 35.8 %
HGB BLD-MCNC: 11.3 G/DL
IMM GRANULOCYTES # BLD AUTO: 0.02 X10(3) UL (ref 0–1)
IMM GRANULOCYTES NFR BLD: 0.3 %
LYMPHOCYTES # BLD AUTO: 1.28 X10(3) UL (ref 1–4)
LYMPHOCYTES NFR BLD AUTO: 22 %
MCH RBC QN AUTO: 26.7 PG (ref 26–34)
MCHC RBC AUTO-ENTMCNC: 31.6 G/DL (ref 31–37)
MCV RBC AUTO: 84.4 FL
MONOCYTES # BLD AUTO: 0.3 X10(3) UL (ref 0.1–1)
MONOCYTES NFR BLD AUTO: 5.2 %
NEUTROPHILS # BLD AUTO: 4.16 X10 (3) UL (ref 1.5–7.7)
NEUTROPHILS # BLD AUTO: 4.16 X10(3) UL (ref 1.5–7.7)
NEUTROPHILS NFR BLD AUTO: 71.7 %
OSMOLALITY SERPL CALC.SUM OF ELEC: 292 MOSM/KG (ref 275–295)
PLATELET # BLD AUTO: 316 10(3)UL (ref 150–450)
POTASSIUM SERPL-SCNC: 4.2 MMOL/L (ref 3.5–5.1)
PROT SERPL-MCNC: 7.5 G/DL (ref 6.4–8.2)
RBC # BLD AUTO: 4.24 X10(6)UL
SODIUM SERPL-SCNC: 141 MMOL/L (ref 136–145)
WBC # BLD AUTO: 5.8 X10(3) UL (ref 4–11)

## 2023-03-27 PROCEDURE — 83550 IRON BINDING TEST: CPT | Performed by: INTERNAL MEDICINE

## 2023-03-27 PROCEDURE — 85025 COMPLETE CBC W/AUTO DIFF WBC: CPT | Performed by: INTERNAL MEDICINE

## 2023-03-27 PROCEDURE — 80053 COMPREHEN METABOLIC PANEL: CPT | Performed by: INTERNAL MEDICINE

## 2023-03-27 PROCEDURE — 83540 ASSAY OF IRON: CPT | Performed by: INTERNAL MEDICINE

## 2023-03-27 PROCEDURE — 82728 ASSAY OF FERRITIN: CPT | Performed by: INTERNAL MEDICINE

## 2023-03-27 RX ORDER — METHOTREXATE 2.5 MG/1
25 TABLET ORAL WEEKLY
Qty: 50 TABLET | Refills: 0 | Status: SHIPPED | OUTPATIENT
Start: 2023-04-17 | End: 2023-05-22

## 2023-03-28 ENCOUNTER — TELEPHONE (OUTPATIENT)
Dept: RHEUMATOLOGY | Facility: CLINIC | Age: 53
End: 2023-03-28

## 2023-03-28 DIAGNOSIS — R79.0 LOW FERRITIN: Primary | ICD-10-CM

## 2023-03-28 LAB
DEPRECATED HBV CORE AB SER IA-ACNC: 6.8 NG/ML
IRON SATN MFR SERPL: 7 %
IRON SERPL-MCNC: 30 UG/DL
TIBC SERPL-MCNC: 451 UG/DL (ref 240–450)
TRANSFERRIN SERPL-MCNC: 303 MG/DL (ref 200–360)

## 2023-03-28 RX ORDER — FERROUS SULFATE 325(65) MG
325 TABLET ORAL
Qty: 90 TABLET | Refills: 1 | Status: SHIPPED | OUTPATIENT
Start: 2023-03-28

## 2023-03-28 NOTE — TELEPHONE ENCOUNTER
Background, Mychart 3/28/2023 5:22 PM CDT    Hi can you please send the iron to the pharmacy   Thank you

## 2023-03-30 LAB
LAST PAP RESULT: NORMAL
PAP HISTORY (OTHER THAN LAST PAP): NORMAL

## 2023-04-09 ENCOUNTER — HOSPITAL ENCOUNTER (EMERGENCY)
Age: 53
Discharge: HOME | End: 2023-04-09
Attending: EMERGENCY MEDICINE
Payer: COMMERCIAL

## 2023-04-09 VITALS
RESPIRATION RATE: 18 BRPM | BODY MASS INDEX: 29.66 KG/M2 | DIASTOLIC BLOOD PRESSURE: 98 MMHG | HEART RATE: 82 BPM | HEIGHT: 65 IN | WEIGHT: 178 LBS | SYSTOLIC BLOOD PRESSURE: 137 MMHG | TEMPERATURE: 99 F | OXYGEN SATURATION: 100 %

## 2023-04-09 DIAGNOSIS — R04.0 EPISTAXIS: Primary | ICD-10-CM

## 2023-04-09 PROCEDURE — 30903 CONTROL OF NOSEBLEED: CPT

## 2023-04-09 PROCEDURE — 99284 EMERGENCY DEPT VISIT MOD MDM: CPT

## 2023-04-09 RX ORDER — OXYMETAZOLINE HYDROCHLORIDE 0.05 G/100ML
1 SPRAY NASAL ONCE
Status: COMPLETED | OUTPATIENT
Start: 2023-04-09 | End: 2023-04-09

## 2023-04-09 RX ORDER — CEPHALEXIN 500 MG/1
500 CAPSULE ORAL 3 TIMES DAILY
Qty: 21 CAPSULE | Refills: 0 | Status: SHIPPED | OUTPATIENT
Start: 2023-04-09

## 2023-04-09 RX ORDER — HYDROCODONE BITARTRATE AND ACETAMINOPHEN 5; 325 MG/1; MG/1
1-2 TABLET ORAL EVERY 6 HOURS PRN
Qty: 10 TABLET | Refills: 0 | Status: SHIPPED | OUTPATIENT
Start: 2023-04-09 | End: 2023-04-14

## 2023-04-09 RX ORDER — TRANEXAMIC ACID 100 MG/ML
5 INJECTION, SOLUTION INTRAVENOUS ONCE
Status: COMPLETED | OUTPATIENT
Start: 2023-04-09 | End: 2023-04-09

## 2023-04-09 NOTE — DISCHARGE INSTRUCTIONS
Follow-up with ENT for repeat examination and removal of nasal packing  Take Keflex 500 mg 3 times a day only while packing is in place  Take Norco 1 tablet every 6 hours as needed for pain  Return to the emergency department for any worsening symptoms or new concerns. RECEIVING UNIT ED HANDOFF REVIEW    ED Nurse Handoff Report was reviewed by: Karel Carlos RN on January 13, 2022 at 12:59 AM

## 2023-05-01 DIAGNOSIS — M05.79 RHEUMATOID ARTHRITIS INVOLVING MULTIPLE SITES WITH POSITIVE RHEUMATOID FACTOR (HCC): ICD-10-CM

## 2023-05-01 DIAGNOSIS — D64.9 ANEMIA, UNSPECIFIED TYPE: ICD-10-CM

## 2023-05-01 RX ORDER — METHOTREXATE 2.5 MG/1
25 TABLET ORAL WEEKLY
Qty: 50 TABLET | Refills: 0 | Status: SHIPPED | OUTPATIENT
Start: 2023-05-01 | End: 2023-06-05

## 2023-05-01 NOTE — TELEPHONE ENCOUNTER
Future Appointments   Date Time Provider Dimple Carlos   5/3/2023 12:20 PM Marcio Coello [facility] [address]   5/26/2023  1:00 PM Yasmine Fitzgerald MD [facility] [place]  2/9/2023    Last refill 4/17/2023  50 tabs, 0 refills

## 2023-05-03 ENCOUNTER — OFFICE VISIT (OUTPATIENT)
Dept: INTERNAL MEDICINE CLINIC | Facility: CLINIC | Age: 53
End: 2023-05-03
Payer: COMMERCIAL

## 2023-05-03 VITALS
HEART RATE: 87 BPM | BODY MASS INDEX: 29.32 KG/M2 | RESPIRATION RATE: 16 BRPM | OXYGEN SATURATION: 98 % | DIASTOLIC BLOOD PRESSURE: 76 MMHG | HEIGHT: 65 IN | SYSTOLIC BLOOD PRESSURE: 115 MMHG | WEIGHT: 176 LBS

## 2023-05-03 DIAGNOSIS — E55.9 VITAMIN D DEFICIENCY: ICD-10-CM

## 2023-05-03 DIAGNOSIS — R73.01 IFG (IMPAIRED FASTING GLUCOSE): ICD-10-CM

## 2023-05-03 DIAGNOSIS — M06.9 RHEUMATOID ARTHRITIS, INVOLVING UNSPECIFIED SITE, UNSPECIFIED WHETHER RHEUMATOID FACTOR PRESENT (HCC): ICD-10-CM

## 2023-05-03 DIAGNOSIS — R63.8 CRAVING FOR PARTICULAR FOOD: ICD-10-CM

## 2023-05-03 DIAGNOSIS — E66.3 OVERWEIGHT (BMI 25.0-29.9): ICD-10-CM

## 2023-05-03 DIAGNOSIS — Z51.81 ENCOUNTER FOR THERAPEUTIC DRUG LEVEL MONITORING: Primary | ICD-10-CM

## 2023-05-03 RX ORDER — TOPIRAMATE 25 MG/1
25 TABLET ORAL 2 TIMES DAILY
Qty: 180 TABLET | Refills: 0 | Status: SHIPPED | OUTPATIENT
Start: 2023-05-03

## 2023-05-03 RX ORDER — GINSENG 100 MG
CAPSULE ORAL
COMMUNITY
Start: 2023-04-12

## 2023-05-09 ENCOUNTER — TELEPHONE (OUTPATIENT)
Dept: INTERNAL MEDICINE CLINIC | Facility: CLINIC | Age: 53
End: 2023-05-09

## 2023-05-09 RX ORDER — METFORMIN HYDROCHLORIDE 750 MG/1
750 TABLET, EXTENDED RELEASE ORAL DAILY
Qty: 90 TABLET | Refills: 0 | Status: SHIPPED | OUTPATIENT
Start: 2023-05-09

## 2023-05-12 DIAGNOSIS — M05.79 RHEUMATOID ARTHRITIS INVOLVING MULTIPLE SITES WITH POSITIVE RHEUMATOID FACTOR (HCC): ICD-10-CM

## 2023-05-12 DIAGNOSIS — Z51.81 THERAPEUTIC DRUG MONITORING: ICD-10-CM

## 2023-05-12 RX ORDER — FOLIC ACID 1 MG/1
1 TABLET ORAL DAILY
Qty: 90 TABLET | Refills: 3 | OUTPATIENT
Start: 2023-05-12

## 2023-05-12 NOTE — TELEPHONE ENCOUNTER
Future Appointments   Date Time Provider Dimple Carlos   5/26/2023  1:00 PM Ashley Lira MD [facility] [place]   8/30/2023 12:20 PM Darnell Shahid [facility] [address]     Last office visit: 2/9/2023    Last fill: 2/9/2023 90 tab, 3 refills    Refill declined since there are 3 refills left on the script sent on 2/9/2023

## 2023-05-15 RX ORDER — METFORMIN HYDROCHLORIDE 750 MG/1
750 TABLET, EXTENDED RELEASE ORAL DAILY
Qty: 90 TABLET | Refills: 0 | OUTPATIENT
Start: 2023-05-15

## 2023-05-26 ENCOUNTER — OFFICE VISIT (OUTPATIENT)
Dept: RHEUMATOLOGY | Facility: CLINIC | Age: 53
End: 2023-05-26
Payer: COMMERCIAL

## 2023-05-26 VITALS
DIASTOLIC BLOOD PRESSURE: 76 MMHG | BODY MASS INDEX: 29.18 KG/M2 | SYSTOLIC BLOOD PRESSURE: 122 MMHG | TEMPERATURE: 98 F | OXYGEN SATURATION: 97 % | WEIGHT: 175.13 LBS | HEIGHT: 65 IN | HEART RATE: 87 BPM | RESPIRATION RATE: 16 BRPM

## 2023-05-26 DIAGNOSIS — M05.79 RHEUMATOID ARTHRITIS INVOLVING MULTIPLE SITES WITH POSITIVE RHEUMATOID FACTOR (HCC): Primary | ICD-10-CM

## 2023-05-26 DIAGNOSIS — Z51.81 THERAPEUTIC DRUG MONITORING: ICD-10-CM

## 2023-05-26 DIAGNOSIS — R79.0 LOW FERRITIN: ICD-10-CM

## 2023-05-26 DIAGNOSIS — E53.8 LOW VITAMIN B12 LEVEL: ICD-10-CM

## 2023-05-26 DIAGNOSIS — D64.9 ANEMIA, UNSPECIFIED TYPE: ICD-10-CM

## 2023-05-26 DIAGNOSIS — D84.9 IMMUNOCOMPROMISED (HCC): ICD-10-CM

## 2023-05-26 PROCEDURE — 99214 OFFICE O/P EST MOD 30 MIN: CPT | Performed by: INTERNAL MEDICINE

## 2023-05-26 PROCEDURE — 3078F DIAST BP <80 MM HG: CPT | Performed by: INTERNAL MEDICINE

## 2023-05-26 PROCEDURE — 3008F BODY MASS INDEX DOCD: CPT | Performed by: INTERNAL MEDICINE

## 2023-05-26 PROCEDURE — 3074F SYST BP LT 130 MM HG: CPT | Performed by: INTERNAL MEDICINE

## 2023-05-26 RX ORDER — METHOTREXATE 2.5 MG/1
25 TABLET ORAL WEEKLY
Qty: 130 TABLET | Refills: 0 | Status: SHIPPED | OUTPATIENT
Start: 2023-05-26 | End: 2023-08-25

## 2023-05-26 RX ORDER — PREDNISONE 1 MG/1
5 TABLET ORAL
Qty: 450 TABLET | Refills: 1 | Status: SHIPPED | OUTPATIENT
Start: 2023-05-26

## 2023-05-26 RX ORDER — HYDROXYCHLOROQUINE SULFATE 200 MG/1
200 TABLET, FILM COATED ORAL 2 TIMES DAILY
Qty: 180 TABLET | Refills: 1 | Status: SHIPPED | OUTPATIENT
Start: 2023-05-26

## 2023-05-26 RX ORDER — LEFLUNOMIDE 10 MG/1
10 TABLET ORAL DAILY
Qty: 40 TABLET | Refills: 0 | Status: SHIPPED | OUTPATIENT
Start: 2023-05-26 | End: 2023-07-05

## 2023-05-26 NOTE — PATIENT INSTRUCTIONS
Get -Strep Pneumonia vaccines: Prevnar 20  Methotrexate + leflunomide is safer than methotrexate + Enbrel    For the rheumatoid arthritis:  -continue methotrexate 25 mg (10 tablets) weekly  -continue folic acid daily  -Start leflunomide 10 mg daily  -continue hydroxychloroquine (plaquenil) 200 mg twice daily  -Get blood work in late June 2023 for monitoring     -With 1 mg prednisone tablets: Take 5 tablets (5 mg total) by mouth daily with breakfast for 30  days,   THEN 4 tablets (4 mg total) by mouth daily with breakfast for 14 days  THEN 3 tablets (3 mg total) daily with breakfast for 14 days  THEN 2 tablets (2 mg total) daily with breakfast for 14 days  THEN 1 tablet (1 mg total) daily with breakfast for 14 days    ==============================================================================================================  Try Tuttle iron vitamin instead of the iron pill.

## 2023-06-05 NOTE — TELEPHONE ENCOUNTER
I called patient and she cannot talk - she is at work. She asked me to call back and leave voice message I asked if she has my chart and she does. She wants me to send note via my chart and she will respond.
No notation patient was called. I tried calling now and no answer.
Patient called. Would like call back ASAP from [facility] regarding constipation due to medication. topiramate 25 MG Oral Tab    She said she has left message already. Indicated not an emergency but needs to speak to someone about what to do.     Please call her at [phone]
She can hold medication if causing constipation, can also begin OTC benefiber  We can switch to metformin and see if that works
Home

## 2023-06-15 DIAGNOSIS — M05.79 RHEUMATOID ARTHRITIS INVOLVING MULTIPLE SITES WITH POSITIVE RHEUMATOID FACTOR (HCC): ICD-10-CM

## 2023-06-15 DIAGNOSIS — E53.8 LOW VITAMIN B12 LEVEL: ICD-10-CM

## 2023-06-15 NOTE — TELEPHONE ENCOUNTER
Future Appointments   Date Time Provider Dimple Carlos   8/30/2023 12:20 PM Marcio Coello [facility] [address]   9/28/2023  2:30 PM Yasmine Fitzgerald MD [facility] [address]     LOV   5/26/2023    Last refill  3/15/2023  90 tabs, 3 refills

## 2023-06-20 ENCOUNTER — TELEPHONE (OUTPATIENT)
Dept: RHEUMATOLOGY | Facility: CLINIC | Age: 53
End: 2023-06-20

## 2023-06-20 DIAGNOSIS — M05.79 RHEUMATOID ARTHRITIS INVOLVING MULTIPLE SITES WITH POSITIVE RHEUMATOID FACTOR (HCC): ICD-10-CM

## 2023-06-20 DIAGNOSIS — D64.9 ANEMIA, UNSPECIFIED TYPE: ICD-10-CM

## 2023-06-20 DIAGNOSIS — R79.0 LOW FERRITIN: ICD-10-CM

## 2023-06-20 DIAGNOSIS — E53.8 LOW VITAMIN B12 LEVEL: ICD-10-CM

## 2023-06-20 RX ORDER — METHOTREXATE 2.5 MG/1
25 TABLET ORAL WEEKLY
Qty: 130 TABLET | Refills: 0 | OUTPATIENT
Start: 2023-06-20 | End: 2023-09-19

## 2023-06-20 NOTE — TELEPHONE ENCOUNTER
Pt called office with question how to schedule to have her blood work completed. Number to CS given.

## 2023-06-20 NOTE — TELEPHONE ENCOUNTER
Future Appointments   Date Time Provider Dimple Carlos   8/30/2023 12:20 PM [facility] [address]   10/5/2023  3:00 PM Nataliia Chau MD [facility] [address]     Last office visit: 5/26/2023    Last sent to pharmacy: 5/26/2023 130 tab, 0 refills    Refill declined since there should be refills at the pharmacy

## 2023-06-21 ENCOUNTER — PATIENT MESSAGE (OUTPATIENT)
Dept: RHEUMATOLOGY | Facility: CLINIC | Age: 53
End: 2023-06-21

## 2023-06-21 DIAGNOSIS — M05.79 RHEUMATOID ARTHRITIS INVOLVING MULTIPLE SITES WITH POSITIVE RHEUMATOID FACTOR (HCC): ICD-10-CM

## 2023-06-21 DIAGNOSIS — Z51.81 THERAPEUTIC DRUG MONITORING: Primary | ICD-10-CM

## 2023-06-22 DIAGNOSIS — R79.0 LOW FERRITIN: ICD-10-CM

## 2023-06-22 RX ORDER — FERROUS SULFATE 325(65) MG
325 TABLET ORAL
Qty: 90 TABLET | Refills: 1 | Status: SHIPPED | OUTPATIENT
Start: 2023-06-22

## 2023-06-22 NOTE — TELEPHONE ENCOUNTER
Future Appointments   Date Time Provider Dimple Cardenasi   8/30/2023 12:20 PM Macey Bell [facility] [address]   10/5/2023  3:00 PM Cayden Piedra MD [facility] [address]     Last office visit: 5/26/2023    Last fill: 3/28/2023 90 tab, 1 refill    Last lab: 3/27/2023

## 2023-06-27 ENCOUNTER — LAB ENCOUNTER (OUTPATIENT)
Dept: LAB | Age: 53
End: 2023-06-27
Attending: INTERNAL MEDICINE
Payer: COMMERCIAL

## 2023-06-27 DIAGNOSIS — R73.01 IFG (IMPAIRED FASTING GLUCOSE): ICD-10-CM

## 2023-06-27 DIAGNOSIS — M05.79 RHEUMATOID ARTHRITIS INVOLVING MULTIPLE SITES WITH POSITIVE RHEUMATOID FACTOR (HCC): ICD-10-CM

## 2023-06-27 DIAGNOSIS — E55.9 VITAMIN D DEFICIENCY: ICD-10-CM

## 2023-06-27 DIAGNOSIS — Z51.81 ENCOUNTER FOR THERAPEUTIC DRUG LEVEL MONITORING: ICD-10-CM

## 2023-06-27 DIAGNOSIS — E53.8 LOW VITAMIN B12 LEVEL: ICD-10-CM

## 2023-06-27 DIAGNOSIS — M06.9 RHEUMATOID ARTHRITIS, INVOLVING UNSPECIFIED SITE, UNSPECIFIED WHETHER RHEUMATOID FACTOR PRESENT (HCC): ICD-10-CM

## 2023-06-27 DIAGNOSIS — D64.9 ANEMIA, UNSPECIFIED TYPE: ICD-10-CM

## 2023-06-27 DIAGNOSIS — E66.3 OVERWEIGHT (BMI 25.0-29.9): ICD-10-CM

## 2023-06-27 DIAGNOSIS — R79.0 LOW FERRITIN: ICD-10-CM

## 2023-06-27 DIAGNOSIS — R63.8 CRAVING FOR PARTICULAR FOOD: ICD-10-CM

## 2023-06-27 LAB
ALBUMIN SERPL-MCNC: 3.9 G/DL (ref 3.4–5)
ALBUMIN/GLOB SERPL: 1 {RATIO} (ref 1–2)
ALP LIVER SERPL-CCNC: 82 U/L
ALT SERPL-CCNC: 25 U/L
ANION GAP SERPL CALC-SCNC: 4 MMOL/L (ref 0–18)
AST SERPL-CCNC: 26 U/L (ref 15–37)
BASOPHILS # BLD AUTO: 0.03 X10(3) UL (ref 0–0.2)
BASOPHILS NFR BLD AUTO: 0.6 %
BILIRUB SERPL-MCNC: 0.6 MG/DL (ref 0.1–2)
BUN BLD-MCNC: 9 MG/DL (ref 7–18)
CALCIUM BLD-MCNC: 9.7 MG/DL (ref 8.5–10.1)
CHLORIDE SERPL-SCNC: 109 MMOL/L (ref 98–112)
CHOLEST SERPL-MCNC: 172 MG/DL (ref ?–200)
CO2 SERPL-SCNC: 27 MMOL/L (ref 21–32)
CREAT BLD-MCNC: 0.72 MG/DL
DEPRECATED HBV CORE AB SER IA-ACNC: 32.7 NG/ML
EOSINOPHIL # BLD AUTO: 0.05 X10(3) UL (ref 0–0.7)
EOSINOPHIL NFR BLD AUTO: 1.1 %
ERYTHROCYTE [DISTWIDTH] IN BLOOD BY AUTOMATED COUNT: 17 %
FASTING PATIENT LIPID ANSWER: YES
FASTING STATUS PATIENT QL REPORTED: YES
GFR SERPLBLD BASED ON 1.73 SQ M-ARVRAT: 101 ML/MIN/1.73M2 (ref 60–?)
GLOBULIN PLAS-MCNC: 3.8 G/DL (ref 2.8–4.4)
GLUCOSE BLD-MCNC: 79 MG/DL (ref 70–99)
HCT VFR BLD AUTO: 38.8 %
HDLC SERPL-MCNC: 66 MG/DL (ref 40–59)
HGB BLD-MCNC: 12.2 G/DL
IMM GRANULOCYTES # BLD AUTO: 0.01 X10(3) UL (ref 0–1)
IMM GRANULOCYTES NFR BLD: 0.2 %
LDLC SERPL CALC-MCNC: 93 MG/DL (ref ?–100)
LYMPHOCYTES # BLD AUTO: 1.69 X10(3) UL (ref 1–4)
LYMPHOCYTES NFR BLD AUTO: 36.6 %
MCH RBC QN AUTO: 28.8 PG (ref 26–34)
MCHC RBC AUTO-ENTMCNC: 31.4 G/DL (ref 31–37)
MCV RBC AUTO: 91.7 FL
MONOCYTES # BLD AUTO: 0.35 X10(3) UL (ref 0.1–1)
MONOCYTES NFR BLD AUTO: 7.6 %
NEUTROPHILS # BLD AUTO: 2.49 X10 (3) UL (ref 1.5–7.7)
NEUTROPHILS # BLD AUTO: 2.49 X10(3) UL (ref 1.5–7.7)
NEUTROPHILS NFR BLD AUTO: 53.9 %
NONHDLC SERPL-MCNC: 106 MG/DL (ref ?–130)
OSMOLALITY SERPL CALC.SUM OF ELEC: 288 MOSM/KG (ref 275–295)
PLATELET # BLD AUTO: 237 10(3)UL (ref 150–450)
POTASSIUM SERPL-SCNC: 3.8 MMOL/L (ref 3.5–5.1)
PROT SERPL-MCNC: 7.7 G/DL (ref 6.4–8.2)
RBC # BLD AUTO: 4.23 X10(6)UL
SODIUM SERPL-SCNC: 140 MMOL/L (ref 136–145)
T4 FREE SERPL-MCNC: 1 NG/DL (ref 0.8–1.7)
TRIGL SERPL-MCNC: 66 MG/DL (ref 30–149)
TSI SER-ACNC: 1.04 MIU/ML (ref 0.36–3.74)
VIT B12 SERPL-MCNC: 1013 PG/ML (ref 193–986)
VLDLC SERPL CALC-MCNC: 11 MG/DL (ref 0–30)
WBC # BLD AUTO: 4.6 X10(3) UL (ref 4–11)

## 2023-06-27 PROCEDURE — 85025 COMPLETE CBC W/AUTO DIFF WBC: CPT

## 2023-06-27 PROCEDURE — 80053 COMPREHEN METABOLIC PANEL: CPT

## 2023-06-27 PROCEDURE — 84439 ASSAY OF FREE THYROXINE: CPT

## 2023-06-27 PROCEDURE — 83036 HEMOGLOBIN GLYCOSYLATED A1C: CPT

## 2023-06-27 PROCEDURE — 82728 ASSAY OF FERRITIN: CPT

## 2023-06-27 PROCEDURE — 82607 VITAMIN B-12: CPT

## 2023-06-27 PROCEDURE — 82306 VITAMIN D 25 HYDROXY: CPT

## 2023-06-27 PROCEDURE — 36415 COLL VENOUS BLD VENIPUNCTURE: CPT

## 2023-06-27 PROCEDURE — 80061 LIPID PANEL: CPT

## 2023-06-27 PROCEDURE — 84443 ASSAY THYROID STIM HORMONE: CPT

## 2023-06-28 LAB
EST. AVERAGE GLUCOSE BLD GHB EST-MCNC: 117 MG/DL (ref 68–126)
HBA1C MFR BLD: 5.7 % (ref ?–5.7)
VIT D+METAB SERPL-MCNC: 28.8 NG/ML (ref 30–100)

## 2023-06-30 ENCOUNTER — TELEPHONE (OUTPATIENT)
Dept: RHEUMATOLOGY | Facility: CLINIC | Age: 53
End: 2023-06-30

## 2023-06-30 RX ORDER — ERGOCALCIFEROL 1.25 MG/1
50000 CAPSULE ORAL WEEKLY
Qty: 12 CAPSULE | Refills: 0 | Status: CANCELLED | OUTPATIENT
Start: 2023-06-30

## 2023-08-05 DIAGNOSIS — M05.79 RHEUMATOID ARTHRITIS INVOLVING MULTIPLE SITES WITH POSITIVE RHEUMATOID FACTOR (HCC): ICD-10-CM

## 2023-08-05 DIAGNOSIS — R79.0 LOW FERRITIN: ICD-10-CM

## 2023-08-05 DIAGNOSIS — D64.9 ANEMIA, UNSPECIFIED TYPE: ICD-10-CM

## 2023-08-05 DIAGNOSIS — E53.8 LOW VITAMIN B12 LEVEL: ICD-10-CM

## 2023-08-07 RX ORDER — METHOTREXATE 2.5 MG/1
25 TABLET ORAL WEEKLY
Qty: 130 TABLET | Refills: 0 | Status: SHIPPED | OUTPATIENT
Start: 2023-08-07 | End: 2023-11-06

## 2023-08-07 NOTE — TELEPHONE ENCOUNTER
Future Appointments   Date Time Provider Dimple Cardenasi   8/30/2023 12:20 PM Macey Bell [facility] [address]   10/5/2023  3:00 PM Cayden Piedra MD [facility] [address]     Last office visit: 5/26/2023    Last fill: 5/26/2023 130 tab, 0 refills    Last labs: 6/27/2023

## 2023-08-09 DIAGNOSIS — M05.79 RHEUMATOID ARTHRITIS INVOLVING MULTIPLE SITES WITH POSITIVE RHEUMATOID FACTOR (HCC): ICD-10-CM

## 2023-08-09 DIAGNOSIS — Z51.81 THERAPEUTIC DRUG MONITORING: ICD-10-CM

## 2023-08-10 RX ORDER — FOLIC ACID 1 MG/1
1 TABLET ORAL DAILY
Qty: 90 TABLET | Refills: 3 | Status: SHIPPED | OUTPATIENT
Start: 2023-08-10

## 2023-08-10 NOTE — TELEPHONE ENCOUNTER
Future Appointments   Date Time Provider Dimple Breanna   8/30/2023 12:20 PM Sindhu Flores [facility] [address]   10/5/2023  3:00 PM Fatimah Villegas MD [facility] [address]     Last office visit: 5/26/2023    Last fill: 2/9/2023 90 tab, 3 refills

## 2023-08-11 NOTE — TELEPHONE ENCOUNTER
Requesting   Requested Prescriptions     Pending Prescriptions Disp Refills    metFORMIN  MG Oral Tablet 24 Hr 90 tablet 0     Sig: Take 1 tablet (750 mg total) by mouth daily.      LOV: 5/3/23  RTC: not noted  Filled: 5/9/23 #90 with 0 refills    Future Appointments   Date Time Provider Dimple Carlos   8/30/2023 12:20 PM [facility] [address]   10/5/2023  3:00 PM Jing Hirsch MD [facility] [address]

## 2023-08-13 RX ORDER — METFORMIN HYDROCHLORIDE 750 MG/1
750 TABLET, EXTENDED RELEASE ORAL DAILY
Qty: 90 TABLET | Refills: 0 | Status: SHIPPED | OUTPATIENT
Start: 2023-08-13

## 2023-08-21 DIAGNOSIS — D64.9 ANEMIA, UNSPECIFIED TYPE: ICD-10-CM

## 2023-08-21 DIAGNOSIS — M05.79 RHEUMATOID ARTHRITIS INVOLVING MULTIPLE SITES WITH POSITIVE RHEUMATOID FACTOR (HCC): ICD-10-CM

## 2023-08-21 DIAGNOSIS — R79.0 LOW FERRITIN: ICD-10-CM

## 2023-08-21 DIAGNOSIS — E53.8 LOW VITAMIN B12 LEVEL: ICD-10-CM

## 2023-08-22 RX ORDER — METHOTREXATE 2.5 MG/1
25 TABLET ORAL WEEKLY
Qty: 130 TABLET | Refills: 0 | OUTPATIENT
Start: 2023-08-22 | End: 2023-11-21

## 2023-08-22 NOTE — TELEPHONE ENCOUNTER
Requested Prescriptions     Pending Prescriptions Disp Refills    methotrexate 2.5 MG Oral Tab 130 tablet 0     Sig: Take 10 tablets (25 mg total) by mouth once a week.      REQUEST SENT TO [facility]    LF: 8/7/23 #130 TAB W/ 0 RF  LOV: 5/26/23   Future Appointments   Date Time Provider Dimple Carlos   10/5/2023  3:00 PM Prieto Galindo MD [facility] [address]     Labs:   Component      Latest Ref Rng 6/27/2023   WBC      4.0 - 11.0 x10(3) uL 4.6    RBC      3.80 - 5.30 x10(6)uL 4.23    Hemoglobin      12.0 - 16.0 g/dL 12.2    Hematocrit      35.0 - 48.0 % 38.8    Platelet Count      335.0 - 450.0 10(3)uL 237.0    MCV      80.0 - 100.0 fL 91.7    MCH      26.0 - 34.0 pg 28.8    MCHC      31.0 - 37.0 g/dL 31.4    RDW      % 17.0    Prelim Neutrophil Abs      1.50 - 7.70 x10 (3) uL 2.49    Neutrophils Absolute      1.50 - 7.70 x10(3) uL 2.49    Lymphocytes Absolute      1.00 - 4.00 x10(3) uL 1.69    Monocytes Absolute      0.10 - 1.00 x10(3) uL 0.35    Eosinophils Absolute      0.00 - 0.70 x10(3) uL 0.05    Basophils Absolute      0.00 - 0.20 x10(3) uL 0.03    Immature Granulocyte Absolute      0.00 - 1.00 x10(3) uL 0.01    Neutrophils %      % 53.9    Lymphocytes %      % 36.6    Monocytes %      % 7.6    Eosinophils %      % 1.1    Basophils %      % 0.6    Immature Granulocyte %      % 0.2    Glucose      70 - 99 mg/dL 79    Sodium      136 - 145 mmol/L 140    Potassium      3.5 - 5.1 mmol/L 3.8    Chloride      98 - 112 mmol/L 109    Carbon Dioxide, Total      21.0 - 32.0 mmol/L 27.0    ANION GAP      0 - 18 mmol/L 4    BUN      7 - 18 mg/dL 9    CREATININE      0.55 - 1.02 mg/dL 0.72    CALCIUM      8.5 - 10.1 mg/dL 9.7    CALCULATED OSMOLALITY      275 - 295 mOsm/kg 288    eGFR-Cr      >=60 mL/min/1.73m2 101    AST (SGOT)      15 - 37 U/L 26    ALT (SGPT)      13 - 56 U/L 25    ALKALINE PHOSPHATASE      41 - 108 U/L 82    Total Bilirubin      0.1 - 2.0 mg/dL 0.6    PROTEIN, TOTAL      6.4 - 8.2 g/dL 7.7 Albumin      3.4 - 5.0 g/dL 3.9    Globulin      2.8 - 4.4 g/dL 3.8    A/G Ratio      1.0 - 2.0  1.0    Patient Fasting for CMP? Yes    Cholesterol, Total      <200 mg/dL 172    HDL Cholesterol      40 - 59 mg/dL 66 (H)    Triglycerides      30 - 149 mg/dL 66    LDL Cholesterol Calc      <100 mg/dL 93    VLDL      0 - 30 mg/dL 11    NON-HDL CHOLESTEROL      <130 mg/dL 106    Patient Fasting for Lipid? Yes    HEMOGLOBIN A1c      <5.7 % 5.7 (H)    ESTIMATED AVERAGE GLUCOSE      68 - 126 mg/dL 117    T4,Free (Direct)      0.8 - 1.7 ng/dL 1.0    TSH      0.358 - 3.740 mIU/mL 1.040    Vitamin B12      193 - 986 pg/mL 1,013 (H)    FERRITIN      18.0 - 340.0 ng/mL 32.7    VITAMIN D, 25-OH, TOTAL      30.0 - 100.0 ng/mL 28.8 (L)       Legend:  (H) High  (L) Low    Return in about 14 weeks (around 9/1/2023).      Get -Strep Pneumonia vaccines: Prevnar 20  Methotrexate + leflunomide is safer than methotrexate + Enbrel

## 2023-08-27 DIAGNOSIS — M05.79 RHEUMATOID ARTHRITIS INVOLVING MULTIPLE SITES WITH POSITIVE RHEUMATOID FACTOR (HCC): ICD-10-CM

## 2023-08-27 DIAGNOSIS — E53.8 LOW VITAMIN B12 LEVEL: ICD-10-CM

## 2023-08-27 DIAGNOSIS — R79.0 LOW FERRITIN: ICD-10-CM

## 2023-08-27 DIAGNOSIS — D64.9 ANEMIA, UNSPECIFIED TYPE: ICD-10-CM

## 2023-08-28 RX ORDER — METHOTREXATE 2.5 MG/1
25 TABLET ORAL WEEKLY
Qty: 130 TABLET | Refills: 0 | OUTPATIENT
Start: 2023-08-28 | End: 2023-11-27

## 2023-08-29 DIAGNOSIS — D64.9 ANEMIA, UNSPECIFIED TYPE: ICD-10-CM

## 2023-08-29 DIAGNOSIS — M05.79 RHEUMATOID ARTHRITIS INVOLVING MULTIPLE SITES WITH POSITIVE RHEUMATOID FACTOR (HCC): ICD-10-CM

## 2023-08-29 DIAGNOSIS — E53.8 LOW VITAMIN B12 LEVEL: ICD-10-CM

## 2023-08-29 DIAGNOSIS — Z51.81 THERAPEUTIC DRUG MONITORING: ICD-10-CM

## 2023-08-29 DIAGNOSIS — R79.0 LOW FERRITIN: ICD-10-CM

## 2023-08-30 RX ORDER — HYDROXYCHLOROQUINE SULFATE 200 MG/1
200 TABLET, FILM COATED ORAL 2 TIMES DAILY
Qty: 180 TABLET | Refills: 1 | Status: SHIPPED | OUTPATIENT
Start: 2023-08-30

## 2023-09-02 DIAGNOSIS — M05.79 RHEUMATOID ARTHRITIS INVOLVING MULTIPLE SITES WITH POSITIVE RHEUMATOID FACTOR (HCC): ICD-10-CM

## 2023-09-02 DIAGNOSIS — D64.9 ANEMIA, UNSPECIFIED TYPE: ICD-10-CM

## 2023-09-02 DIAGNOSIS — E53.8 LOW VITAMIN B12 LEVEL: ICD-10-CM

## 2023-09-02 DIAGNOSIS — R79.0 LOW FERRITIN: ICD-10-CM

## 2023-10-05 ENCOUNTER — OFFICE VISIT (OUTPATIENT)
Dept: RHEUMATOLOGY | Facility: CLINIC | Age: 53
End: 2023-10-05
Payer: COMMERCIAL

## 2023-10-05 VITALS
HEIGHT: 65 IN | OXYGEN SATURATION: 98 % | DIASTOLIC BLOOD PRESSURE: 70 MMHG | SYSTOLIC BLOOD PRESSURE: 100 MMHG | WEIGHT: 175 LBS | RESPIRATION RATE: 16 BRPM | HEART RATE: 76 BPM | TEMPERATURE: 98 F | BODY MASS INDEX: 29.16 KG/M2

## 2023-10-05 DIAGNOSIS — G57.63 MORTON'S NEUROMA OF BOTH FEET: ICD-10-CM

## 2023-10-05 DIAGNOSIS — R79.0 LOW FERRITIN: ICD-10-CM

## 2023-10-05 DIAGNOSIS — Z51.81 THERAPEUTIC DRUG MONITORING: ICD-10-CM

## 2023-10-05 DIAGNOSIS — F41.9 ANXIETY: ICD-10-CM

## 2023-10-05 DIAGNOSIS — K21.9 GASTROESOPHAGEAL REFLUX DISEASE, UNSPECIFIED WHETHER ESOPHAGITIS PRESENT: ICD-10-CM

## 2023-10-05 DIAGNOSIS — M05.79 RHEUMATOID ARTHRITIS INVOLVING MULTIPLE SITES WITH POSITIVE RHEUMATOID FACTOR (HCC): Primary | ICD-10-CM

## 2023-10-05 PROCEDURE — 99214 OFFICE O/P EST MOD 30 MIN: CPT | Performed by: INTERNAL MEDICINE

## 2023-10-05 PROCEDURE — 3074F SYST BP LT 130 MM HG: CPT | Performed by: INTERNAL MEDICINE

## 2023-10-05 PROCEDURE — 3078F DIAST BP <80 MM HG: CPT | Performed by: INTERNAL MEDICINE

## 2023-10-05 PROCEDURE — 3008F BODY MASS INDEX DOCD: CPT | Performed by: INTERNAL MEDICINE

## 2023-10-05 RX ORDER — NICOTINE POLACRILEX 4 MG/1
20 GUM, CHEWING ORAL DAILY
Qty: 30 TABLET | Refills: 0 | Status: SHIPPED | OUTPATIENT
Start: 2023-10-05 | End: 2023-11-04

## 2023-10-05 RX ORDER — LEFLUNOMIDE 10 MG/1
TABLET ORAL
COMMUNITY
Start: 2023-08-31 | End: 2023-10-05

## 2023-10-05 RX ORDER — MELOXICAM 15 MG/1
15 TABLET ORAL DAILY
Qty: 30 TABLET | Refills: 0 | Status: SHIPPED | OUTPATIENT
Start: 2023-10-05

## 2023-10-05 RX ORDER — ACETAMINOPHEN 160 MG
2000 TABLET,DISINTEGRATING ORAL DAILY
COMMUNITY

## 2023-10-05 RX ORDER — ALPRAZOLAM 0.25 MG/1
0.25 TABLET ORAL NIGHTLY PRN
Qty: 15 TABLET | Refills: 0 | Status: SHIPPED | OUTPATIENT
Start: 2023-10-05

## 2023-10-05 NOTE — PATIENT INSTRUCTIONS
-Try Flindstones iron pill  -continue methotrexate 25 mg (10 tablets) weekly  -continue folic acid daily  -continue hydroxychloroquine (plaquenil) 200 mg twice daily  -Stop prednisone    Take meloxicam 15 mg daily with food, don't take ibuprofen, naproxen, voltaren the same day, they are in the same family. Lidocaine patch on the neck and back    ==============================================================================================================  For Autumn/Winter 2023:  -get flu shot + get Covid booster skip one dose of methotrexate after the flu shot for better effectiveness.

## 2023-10-10 ENCOUNTER — LAB ENCOUNTER (OUTPATIENT)
Dept: LAB | Age: 53
End: 2023-10-10
Attending: INTERNAL MEDICINE
Payer: COMMERCIAL

## 2023-10-10 DIAGNOSIS — Z51.81 THERAPEUTIC DRUG MONITORING: ICD-10-CM

## 2023-10-10 DIAGNOSIS — R79.0 LOW FERRITIN: ICD-10-CM

## 2023-10-10 DIAGNOSIS — M05.79 RHEUMATOID ARTHRITIS INVOLVING MULTIPLE SITES WITH POSITIVE RHEUMATOID FACTOR (HCC): ICD-10-CM

## 2023-10-10 LAB
BASOPHILS # BLD AUTO: 0.03 X10(3) UL (ref 0–0.2)
BASOPHILS NFR BLD AUTO: 0.8 %
EOSINOPHIL # BLD AUTO: 0.08 X10(3) UL (ref 0–0.7)
EOSINOPHIL NFR BLD AUTO: 2 %
ERYTHROCYTE [DISTWIDTH] IN BLOOD BY AUTOMATED COUNT: 13.8 %
ERYTHROCYTE [SEDIMENTATION RATE] IN BLOOD: 18 MM/HR
HCT VFR BLD AUTO: 37.2 %
HGB BLD-MCNC: 11.9 G/DL
IMM GRANULOCYTES # BLD AUTO: 0.01 X10(3) UL (ref 0–1)
IMM GRANULOCYTES NFR BLD: 0.3 %
LYMPHOCYTES # BLD AUTO: 1.64 X10(3) UL (ref 1–4)
LYMPHOCYTES NFR BLD AUTO: 41.4 %
MCH RBC QN AUTO: 30.3 PG (ref 26–34)
MCHC RBC AUTO-ENTMCNC: 32 G/DL (ref 31–37)
MCV RBC AUTO: 94.7 FL
MONOCYTES # BLD AUTO: 0.35 X10(3) UL (ref 0.1–1)
MONOCYTES NFR BLD AUTO: 8.8 %
NEUTROPHILS # BLD AUTO: 1.85 X10 (3) UL (ref 1.5–7.7)
NEUTROPHILS # BLD AUTO: 1.85 X10(3) UL (ref 1.5–7.7)
NEUTROPHILS NFR BLD AUTO: 46.7 %
PLATELET # BLD AUTO: 257 10(3)UL (ref 150–450)
RBC # BLD AUTO: 3.93 X10(6)UL
WBC # BLD AUTO: 4 X10(3) UL (ref 4–11)

## 2023-10-10 PROCEDURE — 83550 IRON BINDING TEST: CPT

## 2023-10-10 PROCEDURE — 85025 COMPLETE CBC W/AUTO DIFF WBC: CPT

## 2023-10-10 PROCEDURE — 80053 COMPREHEN METABOLIC PANEL: CPT

## 2023-10-10 PROCEDURE — 82728 ASSAY OF FERRITIN: CPT

## 2023-10-10 PROCEDURE — 86140 C-REACTIVE PROTEIN: CPT

## 2023-10-10 PROCEDURE — 85652 RBC SED RATE AUTOMATED: CPT

## 2023-10-10 PROCEDURE — 83540 ASSAY OF IRON: CPT

## 2023-10-10 PROCEDURE — 36415 COLL VENOUS BLD VENIPUNCTURE: CPT

## 2023-10-11 ENCOUNTER — TELEPHONE (OUTPATIENT)
Dept: RHEUMATOLOGY | Facility: CLINIC | Age: 53
End: 2023-10-11

## 2023-10-11 DIAGNOSIS — M05.79 RHEUMATOID ARTHRITIS INVOLVING MULTIPLE SITES WITH POSITIVE RHEUMATOID FACTOR (HCC): ICD-10-CM

## 2023-10-11 DIAGNOSIS — R79.89 LOW VITAMIN B12 LEVEL: ICD-10-CM

## 2023-10-11 DIAGNOSIS — D64.9 ANEMIA, UNSPECIFIED TYPE: ICD-10-CM

## 2023-10-11 DIAGNOSIS — R79.0 LOW FERRITIN: ICD-10-CM

## 2023-10-11 LAB
ALBUMIN SERPL-MCNC: 3.6 G/DL (ref 3.4–5)
ALBUMIN/GLOB SERPL: 1 {RATIO} (ref 1–2)
ALP LIVER SERPL-CCNC: 74 U/L
ALT SERPL-CCNC: 23 U/L
ANION GAP SERPL CALC-SCNC: 5 MMOL/L (ref 0–18)
AST SERPL-CCNC: 14 U/L (ref 15–37)
BILIRUB SERPL-MCNC: 0.4 MG/DL (ref 0.1–2)
BUN BLD-MCNC: 9 MG/DL (ref 7–18)
CALCIUM BLD-MCNC: 9.4 MG/DL (ref 8.5–10.1)
CHLORIDE SERPL-SCNC: 107 MMOL/L (ref 98–112)
CO2 SERPL-SCNC: 28 MMOL/L (ref 21–32)
CREAT BLD-MCNC: 0.61 MG/DL
CRP SERPL-MCNC: 0.68 MG/DL (ref ?–0.3)
DEPRECATED HBV CORE AB SER IA-ACNC: 51.5 NG/ML
EGFRCR SERPLBLD CKD-EPI 2021: 108 ML/MIN/1.73M2 (ref 60–?)
FASTING STATUS PATIENT QL REPORTED: NO
GLOBULIN PLAS-MCNC: 3.7 G/DL (ref 2.8–4.4)
GLUCOSE BLD-MCNC: 85 MG/DL (ref 70–99)
IRON SATN MFR SERPL: 27 %
IRON SERPL-MCNC: 101 UG/DL
OSMOLALITY SERPL CALC.SUM OF ELEC: 288 MOSM/KG (ref 275–295)
POTASSIUM SERPL-SCNC: 4 MMOL/L (ref 3.5–5.1)
PROT SERPL-MCNC: 7.3 G/DL (ref 6.4–8.2)
SODIUM SERPL-SCNC: 140 MMOL/L (ref 136–145)
TIBC SERPL-MCNC: 377 UG/DL (ref 240–450)
TRANSFERRIN SERPL-MCNC: 253 MG/DL (ref 200–360)

## 2023-10-11 RX ORDER — METHOTREXATE 2.5 MG/1
25 TABLET ORAL WEEKLY
Qty: 130 TABLET | Refills: 0 | Status: SHIPPED | OUTPATIENT
Start: 2023-10-11 | End: 2024-01-10

## 2023-10-22 ENCOUNTER — PATIENT MESSAGE (OUTPATIENT)
Dept: RHEUMATOLOGY | Facility: CLINIC | Age: 53
End: 2023-10-22

## 2023-10-22 DIAGNOSIS — K21.9 GASTROESOPHAGEAL REFLUX DISEASE, UNSPECIFIED WHETHER ESOPHAGITIS PRESENT: ICD-10-CM

## 2023-10-23 RX ORDER — NICOTINE POLACRILEX 4 MG/1
20 GUM, CHEWING ORAL DAILY
Qty: 90 TABLET | Refills: 0 | Status: SHIPPED | OUTPATIENT
Start: 2023-10-23 | End: 2023-11-22

## 2023-10-23 NOTE — TELEPHONE ENCOUNTER
From: Julio Bingham  To: Brianna Merceeds  Sent: 10/22/2023 10:19 PM CDT  Subject: Medication needed     Hi Dr Patel Sites   Can you please send this medication to the pharmacy   omeprazole  Acid reflux   Heartburn

## 2023-11-13 DIAGNOSIS — Z51.81 THERAPEUTIC DRUG MONITORING: ICD-10-CM

## 2023-11-13 DIAGNOSIS — G57.63 MORTON'S NEUROMA OF BOTH FEET: ICD-10-CM

## 2023-11-13 DIAGNOSIS — R79.0 LOW FERRITIN: ICD-10-CM

## 2023-11-13 DIAGNOSIS — M05.79 RHEUMATOID ARTHRITIS INVOLVING MULTIPLE SITES WITH POSITIVE RHEUMATOID FACTOR (HCC): ICD-10-CM

## 2023-11-13 RX ORDER — MELOXICAM 15 MG/1
15 TABLET ORAL DAILY
Qty: 30 TABLET | Refills: 0 | Status: SHIPPED | OUTPATIENT
Start: 2023-11-13

## 2023-11-13 NOTE — TELEPHONE ENCOUNTER
Last office visit: 10/5/2023    Next Rheum Apt:2/1/2024 Whit Parish MD    Last fill: 10/5/2023   30 tabs, 0 refills     Labs:   Lab Results   Component Value Date    CREATSERUM 0.61 10/10/2023    ALKPHO 74 10/10/2023    AST 14 (L) 10/10/2023    ALT 23 10/10/2023    BILT 0.4 10/10/2023    TP 7.3 10/10/2023    ALB 3.6 10/10/2023       Lab Results   Component Value Date    WBC 4.0 10/10/2023    HGB 11.9 (L) 10/10/2023    .0 10/10/2023    NEPRELIM 1.85 10/10/2023    NEPERCENT 46.7 10/10/2023    LYPERCENT 41.4 10/10/2023    NE 1.85 10/10/2023    LYMABS 1.64 10/10/2023

## 2023-11-15 RX ORDER — METFORMIN HYDROCHLORIDE 750 MG/1
750 TABLET, EXTENDED RELEASE ORAL DAILY
Qty: 90 TABLET | Refills: 0 | OUTPATIENT
Start: 2023-11-15

## 2023-11-15 NOTE — TELEPHONE ENCOUNTER
Requesting   Requested Prescriptions     Pending Prescriptions Disp Refills    METFORMIN  MG Oral Tablet 24 Hr [Pharmacy Med Name: METFORMIN ER 750MG 24HR TABS] 90 tablet 0     Sig: TAKE 1 TABLET(750 MG) BY MOUTH DAILY     LOV: 5/3/23  RTC: not noted  Filled: 8/13/23 #90 with 0 refills    Future Appointments   Date Time Provider Dimple Carlos   2/1/2024  3:30 PM Anisa Disla MD [facility] [address]     Needs appt

## 2023-11-19 NOTE — TELEPHONE ENCOUNTER
Requesting   Requested Prescriptions     Pending Prescriptions Disp Refills    METFORMIN  MG Oral Tablet 24 Hr [Pharmacy Med Name: METFORMIN ER 750MG 24HR TABS] 90 tablet 0     Sig: TAKE 1 TABLET(750 MG) BY MOUTH DAILY       LOV: 5/23/23  RTC: 3 months   Last Relevant Labs:   Filled:  8/13/23 #90 with 0 refills    Future Appointments   Date Time Provider Dimple Carlos   2/1/2024  3:30 PM Destiney Mccall MD [facility] [address]

## 2023-11-24 RX ORDER — METFORMIN HYDROCHLORIDE 750 MG/1
750 TABLET, EXTENDED RELEASE ORAL DAILY
Qty: 90 TABLET | Refills: 0 | OUTPATIENT
Start: 2023-11-24

## 2023-11-24 NOTE — TELEPHONE ENCOUNTER
Future Appointments   Date Time Provider Dimple Carlos   2/1/2024  3:30 PM Eliceo Montgomery MD [facility] [address]     Pt did see she needs appt and did not schedule. Has been 6 months without a visit. Refill denied.

## 2023-11-30 ENCOUNTER — TELEPHONE (OUTPATIENT)
Dept: RHEUMATOLOGY | Facility: CLINIC | Age: 53
End: 2023-11-30

## 2023-11-30 NOTE — TELEPHONE ENCOUNTER
Pt called office, pt taking hydroxychloroquine 2 tabs daily. Pt has noticed worsening vision. Having trouble focusing. Pt denies other symptoms. Pt would like to know if she should reduce her medication to once daily. Pt states she had eye exam about a year ago. Urged pt to contact her ophthalmologist with today's call.

## 2023-11-30 NOTE — TELEPHONE ENCOUNTER
Let patient know. Unlikely to be from hydroxychloroquine (plaquenil). Hydroxychloroquine (plaquenil) only affects vision after 5 years of use generally. But ok to decrease to 1 pill daily for now. See eye doctor to figure out what is going on.

## 2023-12-01 NOTE — TELEPHONE ENCOUNTER
[facility] sent to see ophthalmologist. Per provider ok to decrease medication to one pill daily.

## 2023-12-11 DIAGNOSIS — R79.89 LOW VITAMIN B12 LEVEL: ICD-10-CM

## 2023-12-11 DIAGNOSIS — D64.9 ANEMIA, UNSPECIFIED TYPE: ICD-10-CM

## 2023-12-11 DIAGNOSIS — R79.0 LOW FERRITIN: ICD-10-CM

## 2023-12-11 DIAGNOSIS — M05.79 RHEUMATOID ARTHRITIS INVOLVING MULTIPLE SITES WITH POSITIVE RHEUMATOID FACTOR (HCC): ICD-10-CM

## 2023-12-12 RX ORDER — METHOTREXATE 2.5 MG/1
25 TABLET ORAL WEEKLY
Qty: 130 TABLET | Refills: 0 | Status: SHIPPED | OUTPATIENT
Start: 2023-12-12 | End: 2024-03-12

## 2023-12-12 NOTE — TELEPHONE ENCOUNTER
LOV:   10/05/2023      Future Appointments   Date Time Provider Dimple Carlos   2/1/2024  3:30 PM Johnny Rao MD [facility] [address]       LF:   10/11/2023      QTY:    130      Refills:    0    LABS:      Component      Latest Ref Rng 10/10/2023   WBC      4.0 - 11.0 x10(3) uL 4.0    RBC      3.80 - 5.30 x10(6)uL 3.93    Hemoglobin      12.0 - 16.0 g/dL 11.9 (L)    Hematocrit      35.0 - 48.0 % 37.2    Platelet Count      221.0 - 450.0 10(3)uL 257.0    MCV      80.0 - 100.0 fL 94.7    MCH      26.0 - 34.0 pg 30.3    MCHC      31.0 - 37.0 g/dL 32.0    RDW      % 13.8    Prelim Neutrophil Abs      1.50 - 7.70 x10 (3) uL 1.85    Neutrophils Absolute      1.50 - 7.70 x10(3) uL 1.85    Lymphocytes Absolute      1.00 - 4.00 x10(3) uL 1.64    Monocytes Absolute      0.10 - 1.00 x10(3) uL 0.35    Eosinophils Absolute      0.00 - 0.70 x10(3) uL 0.08    Basophils Absolute      0.00 - 0.20 x10(3) uL 0.03    Immature Granulocyte Absolute      0.00 - 1.00 x10(3) uL 0.01    Neutrophils %      % 46.7    Lymphocytes %      % 41.4    Monocytes %      % 8.8    Eosinophils %      % 2.0    Basophils %      % 0.8    Immature Granulocyte %      % 0.3    Glucose      70 - 99 mg/dL 85    Sodium      136 - 145 mmol/L 140    Potassium      3.5 - 5.1 mmol/L 4.0    Chloride      98 - 112 mmol/L 107    Carbon Dioxide, Total      21.0 - 32.0 mmol/L 28.0    ANION GAP      0 - 18 mmol/L 5    BUN      7 - 18 mg/dL 9    CREATININE      0.55 - 1.02 mg/dL 0.61    CALCIUM      8.5 - 10.1 mg/dL 9.4    CALCULATED OSMOLALITY      275 - 295 mOsm/kg 288    EGFR      >=60 mL/min/1.73m2 108    AST (SGOT)      15 - 37 U/L 14 (L)    ALT (SGPT)      13 - 56 U/L 23    ALKALINE PHOSPHATASE      41 - 108 U/L 74    Total Bilirubin      0.1 - 2.0 mg/dL 0.4    PROTEIN, TOTAL      6.4 - 8.2 g/dL 7.3    Albumin      3.4 - 5.0 g/dL 3.6    Globulin      2.8 - 4.4 g/dL 3.7    A/G Ratio      1.0 - 2.0  1.0    Patient Fasting for CMP?  No       Legend:  (L) Low

## 2023-12-19 ENCOUNTER — TELEPHONE (OUTPATIENT)
Dept: RHEUMATOLOGY | Facility: CLINIC | Age: 53
End: 2023-12-19

## 2023-12-19 DIAGNOSIS — G57.63 MORTON'S NEUROMA OF BOTH FEET: ICD-10-CM

## 2023-12-19 DIAGNOSIS — Z51.81 THERAPEUTIC DRUG MONITORING: ICD-10-CM

## 2023-12-19 DIAGNOSIS — R79.0 LOW FERRITIN: ICD-10-CM

## 2023-12-19 DIAGNOSIS — M05.79 RHEUMATOID ARTHRITIS INVOLVING MULTIPLE SITES WITH POSITIVE RHEUMATOID FACTOR (HCC): ICD-10-CM

## 2023-12-19 RX ORDER — MELOXICAM 15 MG/1
15 TABLET ORAL DAILY
Qty: 30 TABLET | Refills: 0 | Status: SHIPPED | OUTPATIENT
Start: 2023-12-19

## 2023-12-19 NOTE — TELEPHONE ENCOUNTER
LOV:   10/05/2023      Future Appointments   Date Time Provider Dimple Carlos   2/1/2024  3:30 PM Maximo King MD [facility] [address]       LF:   11/13/2023     QTY:    30     Refills:   0    LABS:          Component      Latest Ref Rng 10/10/2023   WBC      4.0 - 11.0 x10(3) uL 4.0    RBC      3.80 - 5.30 x10(6)uL 3.93    Hemoglobin      12.0 - 16.0 g/dL 11.9 (L)    Hematocrit      35.0 - 48.0 % 37.2    Platelet Count      149.4 - 450.0 10(3)uL 257.0    MCV      80.0 - 100.0 fL 94.7    MCH      26.0 - 34.0 pg 30.3    MCHC      31.0 - 37.0 g/dL 32.0    RDW      % 13.8    Prelim Neutrophil Abs      1.50 - 7.70 x10 (3) uL 1.85    Neutrophils Absolute      1.50 - 7.70 x10(3) uL 1.85    Lymphocytes Absolute      1.00 - 4.00 x10(3) uL 1.64    Monocytes Absolute      0.10 - 1.00 x10(3) uL 0.35    Eosinophils Absolute      0.00 - 0.70 x10(3) uL 0.08    Basophils Absolute      0.00 - 0.20 x10(3) uL 0.03    Immature Granulocyte Absolute      0.00 - 1.00 x10(3) uL 0.01    Neutrophils %      % 46.7    Lymphocytes %      % 41.4    Monocytes %      % 8.8    Eosinophils %      % 2.0    Basophils %      % 0.8    Immature Granulocyte %      % 0.3    Glucose      70 - 99 mg/dL 85    Sodium      136 - 145 mmol/L 140    Potassium      3.5 - 5.1 mmol/L 4.0    Chloride      98 - 112 mmol/L 107    Carbon Dioxide, Total      21.0 - 32.0 mmol/L 28.0    ANION GAP      0 - 18 mmol/L 5    BUN      7 - 18 mg/dL 9    CREATININE      0.55 - 1.02 mg/dL 0.61    CALCIUM      8.5 - 10.1 mg/dL 9.4    CALCULATED OSMOLALITY      275 - 295 mOsm/kg 288    EGFR      >=60 mL/min/1.73m2 108    AST (SGOT)      15 - 37 U/L 14 (L)    ALT (SGPT)      13 - 56 U/L 23    ALKALINE PHOSPHATASE      41 - 108 U/L 74    Total Bilirubin      0.1 - 2.0 mg/dL 0.4    PROTEIN, TOTAL      6.4 - 8.2 g/dL 7.3    Albumin      3.4 - 5.0 g/dL 3.6    Globulin      2.8 - 4.4 g/dL 3.7    A/G Ratio      1.0 - 2.0  1.0    Patient Fasting for CMP?  No       Legend:  (L) Low

## 2023-12-19 NOTE — TELEPHONE ENCOUNTER
Pt called office, pt decreased her hydroxychloroquine to 1 tab daily. Pt co joint pain to her knees, legs and back. Pt states she has been taking methotrexate as prescribed. Pt is wondering if there is something else Dr. Ephraim Gibson could prescribe. Abbe Flap (Lower To Upper Lip) Text: The defect of the upper lip was assessed and measured.  Given the location and size of the defect, an Abbe flap was deemed most appropriate.  Using a sterile surgical marker, an appropriate Abbe flap was measured and drawn on the lower lip. Local anesthesia was then infiltrated. A scalpel was then used to incise the upper lip through and through the skin, vermilion, muscle and mucosa, leaving the flap pedicled on the opposite side.  The flap was then rotated and transferred to the lower lip defect.  The flap was then sutured into place with a three layer technique, closing the orbicularis oris muscle layer with subcutaneous buried sutures, followed by a mucosal layer and an epidermal layer.

## 2023-12-28 DIAGNOSIS — M05.79 RHEUMATOID ARTHRITIS INVOLVING MULTIPLE SITES WITH POSITIVE RHEUMATOID FACTOR (HCC): ICD-10-CM

## 2023-12-28 DIAGNOSIS — R79.89 LOW VITAMIN B12 LEVEL: ICD-10-CM

## 2023-12-28 NOTE — TELEPHONE ENCOUNTER
Future Appointments   Date Time Provider Dimple Breanna   2/1/2024  3:30 PM Gamaliel Short MD [facility] [address]     Last office visit: 10/5/2023    Last fill: 6/15/2023 90 tab, 3 refills    Refill declined since one year script sent on 6/15/2023

## 2024-01-03 DIAGNOSIS — R79.0 LOW FERRITIN: ICD-10-CM

## 2024-01-04 RX ORDER — FERROUS SULFATE 325(65) MG
325 TABLET ORAL
Qty: 90 TABLET | Refills: 1 | OUTPATIENT
Start: 2024-01-04

## 2024-01-05 DIAGNOSIS — R79.0 LOW FERRITIN: ICD-10-CM

## 2024-01-07 DIAGNOSIS — M05.79 RHEUMATOID ARTHRITIS INVOLVING MULTIPLE SITES WITH POSITIVE RHEUMATOID FACTOR (HCC): ICD-10-CM

## 2024-01-07 DIAGNOSIS — R79.0 LOW FERRITIN: ICD-10-CM

## 2024-01-07 DIAGNOSIS — R79.89 LOW VITAMIN B12 LEVEL: ICD-10-CM

## 2024-01-08 RX ORDER — FERROUS SULFATE 325(65) MG
1 TABLET ORAL
Qty: 90 TABLET | Refills: 1 | OUTPATIENT
Start: 2024-01-08

## 2024-01-08 RX ORDER — FERROUS SULFATE 325(65) MG
325 TABLET ORAL
Qty: 90 TABLET | Refills: 1 | OUTPATIENT
Start: 2024-01-08

## 2024-01-08 NOTE — TELEPHONE ENCOUNTER
LOV:     10/05/2023      Future Appointments   Date Time Provider Department Center   2/1/2024  3:30 PM Virginia Salinas MD [facility] [address]       LF:     06/15/2023  QTY:  90  Refills:  3

## 2024-01-25 DIAGNOSIS — G57.63 MORTON'S NEUROMA OF BOTH FEET: ICD-10-CM

## 2024-01-25 DIAGNOSIS — M05.79 RHEUMATOID ARTHRITIS INVOLVING MULTIPLE SITES WITH POSITIVE RHEUMATOID FACTOR (HCC): ICD-10-CM

## 2024-01-25 DIAGNOSIS — Z51.81 THERAPEUTIC DRUG MONITORING: ICD-10-CM

## 2024-01-25 DIAGNOSIS — R79.0 LOW FERRITIN: ICD-10-CM

## 2024-01-26 RX ORDER — MELOXICAM 15 MG/1
15 TABLET ORAL DAILY
Qty: 30 TABLET | Refills: 0 | Status: SHIPPED | OUTPATIENT
Start: 2024-01-26

## 2024-01-26 NOTE — TELEPHONE ENCOUNTER
LOV:   10/05/2023      Future Appointments   Date Time Provider Department Center   2/1/2024  3:30 PM Virginia Salinas MD [facility] [address]       LF:  12/19/2023   QTY:  30   Refills:  0    LABS:       Component      Latest Ref Rng 10/10/2023   WBC      4.0 - 11.0 x10(3) uL 4.0    RBC      3.80 - 5.30 x10(6)uL 3.93    Hemoglobin      12.0 - 16.0 g/dL 11.9 (L)    Hematocrit      35.0 - 48.0 % 37.2    Platelet Count      150.0 - 450.0 10(3)uL 257.0    MCV      80.0 - 100.0 fL 94.7    MCH      26.0 - 34.0 pg 30.3    MCHC      31.0 - 37.0 g/dL 32.0    RDW      % 13.8    Prelim Neutrophil Abs      1.50 - 7.70 x10 (3) uL 1.85    Neutrophils Absolute      1.50 - 7.70 x10(3) uL 1.85    Lymphocytes Absolute      1.00 - 4.00 x10(3) uL 1.64    Monocytes Absolute      0.10 - 1.00 x10(3) uL 0.35    Eosinophils Absolute      0.00 - 0.70 x10(3) uL 0.08    Basophils Absolute      0.00 - 0.20 x10(3) uL 0.03    Immature Granulocyte Absolute      0.00 - 1.00 x10(3) uL 0.01    Neutrophils %      % 46.7    Lymphocytes %      % 41.4    Monocytes %      % 8.8    Eosinophils %      % 2.0    Basophils %      % 0.8    Immature Granulocyte %      % 0.3    Glucose      70 - 99 mg/dL 85    Sodium      136 - 145 mmol/L 140    Potassium      3.5 - 5.1 mmol/L 4.0    Chloride      98 - 112 mmol/L 107    Carbon Dioxide, Total      21.0 - 32.0 mmol/L 28.0    ANION GAP      0 - 18 mmol/L 5    BUN      7 - 18 mg/dL 9    CREATININE      0.55 - 1.02 mg/dL 0.61    CALCIUM      8.5 - 10.1 mg/dL 9.4    CALCULATED OSMOLALITY      275 - 295 mOsm/kg 288    EGFR      >=60 mL/min/1.73m2 108    AST (SGOT)      15 - 37 U/L 14 (L)    ALT (SGPT)      13 - 56 U/L 23    ALKALINE PHOSPHATASE      41 - 108 U/L 74    Total Bilirubin      0.1 - 2.0 mg/dL 0.4    PROTEIN, TOTAL      6.4 - 8.2 g/dL 7.3    Albumin      3.4 - 5.0 g/dL 3.6    Globulin      2.8 - 4.4 g/dL 3.7    A/G Ratio      1.0 - 2.0  1.0    Patient Fasting for CMP? No       Legend:  (L) Low

## 2024-02-01 ENCOUNTER — OFFICE VISIT (OUTPATIENT)
Dept: RHEUMATOLOGY | Facility: CLINIC | Age: 54
End: 2024-02-01
Payer: COMMERCIAL

## 2024-02-01 VITALS
SYSTOLIC BLOOD PRESSURE: 112 MMHG | BODY MASS INDEX: 28.66 KG/M2 | OXYGEN SATURATION: 98 % | WEIGHT: 172 LBS | RESPIRATION RATE: 16 BRPM | HEART RATE: 71 BPM | HEIGHT: 65 IN | DIASTOLIC BLOOD PRESSURE: 74 MMHG | TEMPERATURE: 98 F

## 2024-02-01 DIAGNOSIS — M05.79 RHEUMATOID ARTHRITIS INVOLVING MULTIPLE SITES WITH POSITIVE RHEUMATOID FACTOR (HCC): Primary | ICD-10-CM

## 2024-02-01 DIAGNOSIS — R79.0 LOW FERRITIN: ICD-10-CM

## 2024-02-01 DIAGNOSIS — Z79.899 IMMUNODEFICIENCY DUE TO DRUG THERAPY  (HCC): ICD-10-CM

## 2024-02-01 DIAGNOSIS — Z51.81 THERAPEUTIC DRUG MONITORING: ICD-10-CM

## 2024-02-01 DIAGNOSIS — D84.821 IMMUNODEFICIENCY DUE TO DRUG THERAPY  (HCC): ICD-10-CM

## 2024-02-01 DIAGNOSIS — M17.12 PRIMARY OSTEOARTHRITIS OF LEFT KNEE: ICD-10-CM

## 2024-02-01 PROCEDURE — 99214 OFFICE O/P EST MOD 30 MIN: CPT | Performed by: INTERNAL MEDICINE

## 2024-02-01 NOTE — PATIENT INSTRUCTIONS
[employer] phone number  [phone].      Decrease meloxicam to 1/2 pill daily x 30 days, then stop. See how you do.   --this should help reflux    For the rheumatoid arthritis:  -continue methotrexate 25 mg (10 tablets) weekly  -continue folic acid daily  -continue hydroxychloroquine (plaquenil) 200 mg twice daily    End of 2023, consider slowly decreasing dose of methotrexate    Start taking 2000 international units of vitamin D3 (cholecalciferol) to maintain this level in the long term. Vitamin D is important for bone health; in addition low levels may contribute to pain and fatigue.   -Goal vitamin D level is between 40 to 60 ng/mL. Toxicity/overdose can occur above 60 ng/mL, so do not overdo it.

## 2024-02-01 NOTE — PROGRESS NOTES
Rheumatology f/u Patient Note  =====================================================================================================    Chief complaint: seropositive RA  Chief Complaint   Patient presents with    Follow - Up     Patient comes into the office today for a f/u apt with Dr for Rheumatoid Arthritis. Patient states that she has pain in her Lt knee and the Rt one started yesterday. Rapid converted to 1.3     PCP  Katerine Pa MD  Phone: [phone]    =====================================================================================================  HPI Date of visit: 11/9/2022    Paz Ocampo is a 53 year old female     Patient here for evaluation of a positive rheumatoid factor and suspected rheumatoid arthritis.  Patient is here with  who is helping to translate from [place].  Patient has a history of chronic polyarthralgia for the last 1 to 2 years.  All joints are affected.  She has received annual cortisone injections to the left knee which has helped.  She has received 2 injections of the knee in the last several months.  -She was found to have a positive rheumatoid factor in summer 2022 and was diagnosed by her PCP with rheumatoid arthritis.  She has been on hydroxychloroquine and a low-dose prednisone for the time being which has been somewhat helpful.  However, her pain remains quite severe.  -Works at [employer].  Work is difficult.  Hard to grasp objects  -Denies any shortness of breath or any other extra-articular symptoms.  Denies any rash.  -4 children.  No miscarriages  -Denies any Raynaud's, dry eyes/mouth  Medications:  hydroxychloroquine (plaquenil) 200 mg daily for a few months  Prednisone 2.5 mg daily   Ibuprofen 400 mg BID prn  ==============================================================================================================  Visit: 10/05/23  She is 70% of her baseline.  More pain in the neck starting yesterday. On the right  Patient self-discontinued  leflunomide 10 mg daily since last visit  Thinking about Enbrel.  Family members on Enbrel for unclear indication  -Left knee pain persists.  -Bilateral foot pain persists; but better  -Tapering prednisone and doing well with this  Medications/therapies:  methotrexate 25 mg weekly on Sunday  Folic acid  hydroxychloroquine (plaquenil) 200 mg twice daily   Prednisone 1 mg daily  ==============================================================================================================  Today's Visit: 02/01/24    Bilateral knee pain is noted. Particularly of the left knee.  Otherwise joints are good.  Enbrel was approved, however patient did not call to schedule shipping.  -Tapered off prednisone since last visit    Medications/therapies:  methotrexate 25 mg weekly on Sunday  Folic acid  hydroxychloroquine (plaquenil) 200 mg twice daily     5 point ROS negative except noted above  I had reviewed past medical and family histories together with allergy and medication lists documented.      Medications:  Current Outpatient Medications on File Prior to Visit   Medication Sig Dispense Refill    Meloxicam 15 MG Oral Tab Take 1 tablet (15 mg total) by mouth daily. 30 tablet 0    cyanocobalamin 1000 MCG Oral Tab Take 1 tablet (1,000 mcg total) by mouth daily. 90 tablet 3    methotrexate 2.5 MG Oral Tab Take 10 tablets (25 mg total) by mouth once a week. 130 tablet 0    ALPRAZolam 0.25 MG Oral Tab Take 1 tablet (0.25 mg total) by mouth nightly as needed. 15 tablet 0    hydroxychloroquine 200 MG Oral Tab Take 1 tablet (200 mg total) by mouth 2 (two) times daily. 180 tablet 1    folic acid 1 MG Oral Tab Take 1 tablet (1 mg total) by mouth daily. 90 tablet 3    bacitracin 500 UNIT/GM External Ointment       Acetaminophen (TYLENOL OR)       cholecalciferol 50 MCG (2000 UT) Oral Cap Take 1 capsule (2,000 Units total) by mouth daily. (Patient not taking: Reported on 2/1/2024)       No current facility-administered medications on  file prior to visit.         Allergies:  No Known Allergies      Objective    Vitals:    02/01/24 1552   BP: 112/74   Pulse: 71   Resp: 16   Temp: 97.7 °F (36.5 °C)   SpO2: 98%   Weight: 172 lb (78 kg)   Height: 5' 5\" (1.651 m)     GEN: NAD, well-nourished.   HEENT: Head: NCAT. Face: No lesions. Eyes: Conjunctiva clear. Sclera are anicteric. PERRLA. EOMs are full.   PULM:  CTAB, easy effort  Extremities: No cyanosis, edema or deformities.   Neurologic: Strength, CN2-12 grossly intact   Psych: normal affect.   Skin: No lesions or rashes.  MSK: 28 joint count performed. No evidence of synovitis in mcp, pip, dip, wrist, elbows, shoulders, hips, knees, ankles, mtp unless otherwise noted. Full ROM of elbows, wrists, knees.     PtGA: 2.5  SJ: 1 (left knee)  TJ: 0  MDGA: 0    Positive MTP squeeze  -Bilateral MCP hypertrophy with ulnar deviation swan-neck deformities    Labs:  Additional Labs:      Lab Results   Component Value Date    WBC 4.0 10/10/2023    RBC 3.93 10/10/2023    HGB 11.9 (L) 10/10/2023    HCT 37.2 10/10/2023    .0 10/10/2023    MCV 94.7 10/10/2023    MCH 30.3 10/10/2023    MCHC 32.0 10/10/2023    RDW 13.8 10/10/2023    NEPRELIM 1.85 10/10/2023    NEPERCENT 46.7 10/10/2023    LYPERCENT 41.4 10/10/2023    MOPERCENT 8.8 10/10/2023    EOPERCENT 2.0 10/10/2023    BAPERCENT 0.8 10/10/2023    NE 1.85 10/10/2023    LYMABS 1.64 10/10/2023    MOABSO 0.35 10/10/2023    EOABSO 0.08 10/10/2023    BAABSO 0.03 10/10/2023     Lab Results   Component Value Date    GLU 85 10/10/2023    BUN 9 10/10/2023    CREATSERUM 0.61 10/10/2023    ANIONGAP 5 10/10/2023    CA 9.4 10/10/2023    OSMOCALC 288 10/10/2023    ALKPHO 74 10/10/2023    AST 14 (L) 10/10/2023    ALT 23 10/10/2023    BILT 0.4 10/10/2023    TP 7.3 10/10/2023    ALB 3.6 10/10/2023    GLOBULIN 3.7 10/10/2023     10/10/2023    K 4.0 10/10/2023     10/10/2023    CO2 28.0 10/10/2023       6/2023  Ferritin WNL  B12 WNL  Vitamin D 20.8  TSH WNL  CBC W  differential WNL  Creatinine 0.72, rest of CMP WNL    3/2023  Ferritin 6.8  Percent sat 7  CBC W differential WNL besides a hemoglobin 11.3  Creatinine 0.72, rest of CMP WNL    1/2023  CBC with differential is normal, creatinine 0.67, rest of CMP is normal    12/2022  STEVEN 1: 640 homogenous  Vitamin B12 206  Uric acid 2.9  IGRA is negative  Hep B/C is negative  UPCR: 37.1/262    CRP 2.93, sed rate 75  C3/4 is negative   lupus anticoagulant, anticardiolipin IgG/IgM, beta-2 glycoprotein IgG/IgM is negative    7/24/2022  STEVEN is positive (no titer)  Extractable nuclear antigen testing by microbead assay (9 and SHELLI's tested): All negative besides chromatin  .7  Ferritin 40.24  WBC 7.3, hemoglobin 11.6, platelets 453  Creatinine 0.6, rest of CMP is normal  HCV negative  Hepatitis B surface antigen, hepatitis B surface antibody is negative  HIV is negative  Lipids are normal  PTH, TSH normal  Vitamin D 27.5      8/8/2022 DEXA from [facility]  Spine T score: -0.5  Left hip (T score): Femoral neck: -0.5, total hip: 0.1  Right hip (T score): Femoral neck 0.2, total hip: 0.4      Radiology:    Radiology review:      =====================================================================================================  Assessment and Plan    Assessment:  1. Rheumatoid arthritis involving multiple sites with positive rheumatoid factor (HCC)    2. Low ferritin    3. Primary osteoarthritis of left knee    4. 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 Therapeutic drug monitoring        #seropositive (+RF, +CCP ) rheumatoid arthritis.  Symptoms have been present since 2021, however irreversible articular damage findings potentially indicate chronicity beyond that period of time. Initial CDAI in 11/2022 was 61.   -Prior medications: leflunomide 10 mg daily for about a month as previously recommended.  She had no side effects but self discontinued the medication due to concerns about polypharmacy.  -Today, CDAI is  3.5 indicating low disease activity.  I suspect knee osteoarthritis is contributing to the patient's residual disease activity score.  Positive MTP squeeze potentially indicating synovitis in the MTPs.    #left knee osteoarthritis: Minimal benefit to corticosteroid injections in the past of which she has received four of.  #Metatarsalgia: Perhaps component of Lee's neuroma vs RA    #Positive STEVEN, positive chromatin: Chromatin can be seen in SLE, however is not part of SLE classification criteria.  No extra-articular features to suggest SLE or another autoimmune disease    Plan:  -Repeat monitoring blood work for methotrexate chronic use, repeat iron studies given low iron previously    Decrease meloxicam to 1/2 pill daily x 30 days, then stop. See how you do.   --this should help reflux    For the rheumatoid arthritis:  -continue methotrexate 25 mg (10 tablets) weekly  -continue folic acid daily  -continue hydroxychloroquine (plaquenil) 200 mg twice daily  -Enbrel has been approved.  Not sure how much benefit she will gain by starting this.  However okay to try the medication for 2 months and see if MTP arthralgia improves implying that her metatarsalgia is due to rheumatoid arthritis.    End of 2024, consider slowly decreasing dose of methotrexate per patient request.     -Continue B12 supplement.  Repeat B12 level  -Given fatigue, repeat iron studies  Start taking 2000 international units of vitamin D3 (cholecalciferol) to maintain this level in the long term. Vitamin D is important for bone health; in addition low levels may contribute to pain and fatigue.   -Goal vitamin D level is between 40 to 60 ng/mL. Toxicity/overdose can occur above 60 ng/mL, so do not overdo it.       GERD: every day PPI    Vaccinations needed given your RA and methotrexate therapy.   Shingles vaccine: 1 dose, then another dose 3 months later.    -rtc 3.5 months    Diagnoses and all orders for this visit:    Rheumatoid arthritis  involving multiple sites with positive rheumatoid factor (HCC)  -     Comp Metabolic Panel (14); Future  -     CBC With Differential With Platelet; Future  -     C-Reactive Protein; Future  -     Sed Rate, Westergren (Automated); Future  -     Iron And Tibc; Future  -     Ferritin; Future  -     TSH W Reflex To Free T4; Future  -     Vitamin B12; Future  -     Comp Metabolic Panel (14); Future  -     CBC With Differential With Platelet; Future    Low ferritin  -     Comp Metabolic Panel (14); Future  -     CBC With Differential With Platelet; Future  -     C-Reactive Protein; Future  -     Sed Rate, Westergren (Automated); Future  -     Iron And Tibc; Future  -     Ferritin; Future  -     TSH W Reflex To Free T4; Future  -     Vitamin B12; Future  -     Comp Metabolic Panel (14); Future  -     CBC With Differential With Platelet; Future    Primary osteoarthritis of left knee    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      The above plan of care, diagnosis, orders, and follow-up were discussed with the patient. Questions related to this recommended plan of care were answered.    Thank you for referring this delightful patient to me. Please feel free to contact me with any questions.     This report was performed utilizing speech recognition software technology. Despite proofreading, speech recognition errors could escape detection. If a word or phrase is confusing or out of context, please do not hesitate to call for   clarification.       Kind regards      Virginia Salinas MD  [facility]

## 2024-02-09 ENCOUNTER — LAB ENCOUNTER (OUTPATIENT)
Dept: LAB | Age: 54
End: 2024-02-09
Attending: INTERNAL MEDICINE
Payer: COMMERCIAL

## 2024-02-09 DIAGNOSIS — R79.0 LOW FERRITIN: ICD-10-CM

## 2024-02-09 DIAGNOSIS — R79.89 LOW VITAMIN B12 LEVEL: ICD-10-CM

## 2024-02-09 DIAGNOSIS — D64.9 ANEMIA, UNSPECIFIED TYPE: ICD-10-CM

## 2024-02-09 DIAGNOSIS — M05.79 RHEUMATOID ARTHRITIS INVOLVING MULTIPLE SITES WITH POSITIVE RHEUMATOID FACTOR (HCC): ICD-10-CM

## 2024-02-09 LAB
ALBUMIN SERPL-MCNC: 3.8 G/DL (ref 3.4–5)
ALBUMIN/GLOB SERPL: 1.1 {RATIO} (ref 1–2)
ALP LIVER SERPL-CCNC: 96 U/L
ALT SERPL-CCNC: 20 U/L
ANION GAP SERPL CALC-SCNC: 5 MMOL/L (ref 0–18)
AST SERPL-CCNC: 23 U/L (ref 15–37)
BASOPHILS # BLD AUTO: 0.03 X10(3) UL (ref 0–0.2)
BASOPHILS NFR BLD AUTO: 0.4 %
BILIRUB SERPL-MCNC: 0.4 MG/DL (ref 0.1–2)
BUN BLD-MCNC: 10 MG/DL (ref 9–23)
CALCIUM BLD-MCNC: 9.2 MG/DL (ref 8.5–10.1)
CHLORIDE SERPL-SCNC: 109 MMOL/L (ref 98–112)
CO2 SERPL-SCNC: 28 MMOL/L (ref 21–32)
CREAT BLD-MCNC: 0.85 MG/DL
CRP SERPL-MCNC: 1.39 MG/DL (ref ?–0.3)
DEPRECATED HBV CORE AB SER IA-ACNC: 73.6 NG/ML
EGFRCR SERPLBLD CKD-EPI 2021: 82 ML/MIN/1.73M2 (ref 60–?)
EOSINOPHIL # BLD AUTO: 0.11 X10(3) UL (ref 0–0.7)
EOSINOPHIL NFR BLD AUTO: 1.5 %
ERYTHROCYTE [DISTWIDTH] IN BLOOD BY AUTOMATED COUNT: 13.3 %
ERYTHROCYTE [SEDIMENTATION RATE] IN BLOOD: 31 MM/HR
FASTING STATUS PATIENT QL REPORTED: YES
GLOBULIN PLAS-MCNC: 3.5 G/DL (ref 2.8–4.4)
GLUCOSE BLD-MCNC: 92 MG/DL (ref 70–99)
HCT VFR BLD AUTO: 36.2 %
HGB BLD-MCNC: 11.7 G/DL
IMM GRANULOCYTES # BLD AUTO: 0.02 X10(3) UL (ref 0–1)
IMM GRANULOCYTES NFR BLD: 0.3 %
IRON SATN MFR SERPL: 5 %
IRON SERPL-MCNC: 19 UG/DL
LYMPHOCYTES # BLD AUTO: 1.61 X10(3) UL (ref 1–4)
LYMPHOCYTES NFR BLD AUTO: 22 %
MCH RBC QN AUTO: 30.6 PG (ref 26–34)
MCHC RBC AUTO-ENTMCNC: 32.3 G/DL (ref 31–37)
MCV RBC AUTO: 94.8 FL
MONOCYTES # BLD AUTO: 0.58 X10(3) UL (ref 0.1–1)
MONOCYTES NFR BLD AUTO: 7.9 %
NEUTROPHILS # BLD AUTO: 4.97 X10 (3) UL (ref 1.5–7.7)
NEUTROPHILS # BLD AUTO: 4.97 X10(3) UL (ref 1.5–7.7)
NEUTROPHILS NFR BLD AUTO: 67.9 %
OSMOLALITY SERPL CALC.SUM OF ELEC: 293 MOSM/KG (ref 275–295)
PLATELET # BLD AUTO: 226 10(3)UL (ref 150–450)
POTASSIUM SERPL-SCNC: 4.3 MMOL/L (ref 3.5–5.1)
PROT SERPL-MCNC: 7.3 G/DL (ref 6.4–8.2)
RBC # BLD AUTO: 3.82 X10(6)UL
SODIUM SERPL-SCNC: 142 MMOL/L (ref 136–145)
TIBC SERPL-MCNC: 347 UG/DL (ref 240–450)
TRANSFERRIN SERPL-MCNC: 233 MG/DL (ref 200–360)
TSI SER-ACNC: 0.92 MIU/ML (ref 0.36–3.74)
VIT B12 SERPL-MCNC: 1565 PG/ML (ref 193–986)
WBC # BLD AUTO: 7.3 X10(3) UL (ref 4–11)

## 2024-02-09 PROCEDURE — 84443 ASSAY THYROID STIM HORMONE: CPT

## 2024-02-09 PROCEDURE — 36415 COLL VENOUS BLD VENIPUNCTURE: CPT

## 2024-02-09 PROCEDURE — 85652 RBC SED RATE AUTOMATED: CPT

## 2024-02-09 PROCEDURE — 83550 IRON BINDING TEST: CPT

## 2024-02-09 PROCEDURE — 86140 C-REACTIVE PROTEIN: CPT

## 2024-02-09 PROCEDURE — 80053 COMPREHEN METABOLIC PANEL: CPT

## 2024-02-09 PROCEDURE — 85025 COMPLETE CBC W/AUTO DIFF WBC: CPT

## 2024-02-09 PROCEDURE — 83540 ASSAY OF IRON: CPT

## 2024-02-09 PROCEDURE — 82728 ASSAY OF FERRITIN: CPT

## 2024-02-09 PROCEDURE — 82607 VITAMIN B-12: CPT

## 2024-02-13 ENCOUNTER — PATIENT MESSAGE (OUTPATIENT)
Dept: RHEUMATOLOGY | Facility: CLINIC | Age: 54
End: 2024-02-13

## 2024-02-13 DIAGNOSIS — M05.79 RHEUMATOID ARTHRITIS INVOLVING MULTIPLE SITES WITH POSITIVE RHEUMATOID FACTOR (HCC): Primary | ICD-10-CM

## 2024-02-14 RX ORDER — MEDROXYPROGESTERONE ACETATE 150 MG/ML
50 INJECTION, SUSPENSION INTRAMUSCULAR WEEKLY
Qty: 4 EACH | Refills: 5 | Status: SHIPPED | OUTPATIENT
Start: 2024-02-14 | End: 2024-02-14

## 2024-02-14 RX ORDER — MEDROXYPROGESTERONE ACETATE 150 MG/ML
50 INJECTION, SUSPENSION INTRAMUSCULAR WEEKLY
Qty: 4 EACH | Refills: 5 | Status: SHIPPED | OUTPATIENT
Start: 2024-02-14 | End: 2024-03-15

## 2024-02-14 NOTE — TELEPHONE ENCOUNTER
Appears to be approved with [employer] from 10/2023 - 10/2024. Recommend sending new script.     Sent with verbal from Dr. Salinas.

## 2024-02-14 NOTE — TELEPHONE ENCOUNTER
From: Paz Ocampo  To: Virginia Salinas  Sent: 2/13/2024 1:47 PM CST  Subject: Result     Bharat Montes De [place]ca   Did check my blood test result everything good??

## 2024-02-14 NOTE — TELEPHONE ENCOUNTER
Msg/call patient to walk her through enbrel Rx/delivery process. PA already approved.  -keep taking b12. Take 1 tab iron daily.

## 2024-02-20 ENCOUNTER — NURSE ONLY (OUTPATIENT)
Dept: RHEUMATOLOGY | Facility: CLINIC | Age: 54
End: 2024-02-20
Payer: COMMERCIAL

## 2024-02-20 DIAGNOSIS — M05.79 RHEUMATOID ARTHRITIS INVOLVING MULTIPLE SITES WITH POSITIVE RHEUMATOID FACTOR (HCC): Primary | ICD-10-CM

## 2024-02-20 PROCEDURE — 99211 OFF/OP EST MAY X REQ PHY/QHP: CPT | Performed by: INTERNAL MEDICINE

## 2024-02-20 NOTE — PROGRESS NOTES
Pt here for Enbrel teaching. Reviewed how to administer subcutaneous injections. Safety, storage and handling reviewed. Demo pen used for teaching purposes, and pt able to return demonstration efficiently. Pt asking appropriate questions and voices understanding. Pt safely administered pt supplied Enbrel 50mg subQ to left upper abdomen. Pt tolerated without incidence.     Lot [id number]  Exp 5/3/26.

## 2024-02-23 DIAGNOSIS — Z51.81 THERAPEUTIC DRUG MONITORING: ICD-10-CM

## 2024-02-23 DIAGNOSIS — R79.89 LOW VITAMIN B12 LEVEL: ICD-10-CM

## 2024-02-23 DIAGNOSIS — M05.79 RHEUMATOID ARTHRITIS INVOLVING MULTIPLE SITES WITH POSITIVE RHEUMATOID FACTOR (HCC): ICD-10-CM

## 2024-02-23 DIAGNOSIS — R79.0 LOW FERRITIN: ICD-10-CM

## 2024-02-23 DIAGNOSIS — D64.9 ANEMIA, UNSPECIFIED TYPE: ICD-10-CM

## 2024-02-23 RX ORDER — HYDROXYCHLOROQUINE SULFATE 200 MG/1
200 TABLET, FILM COATED ORAL 2 TIMES DAILY
Qty: 180 TABLET | Refills: 1 | Status: SHIPPED | OUTPATIENT
Start: 2024-02-23

## 2024-02-23 NOTE — TELEPHONE ENCOUNTER
Last office visit: 2/1/2024    Next Rheum Apt:6/6/2024 Virginia Salinas MD    Last fill: 8/30/2023  180 tabs, 1 refill     Labs:   Lab Results   Component Value Date    CREATSERUM 0.85 02/09/2024    ALKPHO 96 02/09/2024    AST 23 02/09/2024    ALT 20 02/09/2024    BILT 0.4 02/09/2024    TP 7.3 02/09/2024    ALB 3.8 02/09/2024       Lab Results   Component Value Date    WBC 7.3 02/09/2024    HGB 11.7 (L) 02/09/2024    .0 02/09/2024    NEPRELIM 4.97 02/09/2024    NEPERCENT 67.9 02/09/2024    LYPERCENT 22.0 02/09/2024    NE 4.97 02/09/2024    LYMABS 1.61 02/09/2024

## 2024-02-25 DIAGNOSIS — M05.79 RHEUMATOID ARTHRITIS INVOLVING MULTIPLE SITES WITH POSITIVE RHEUMATOID FACTOR (HCC): ICD-10-CM

## 2024-02-25 DIAGNOSIS — R79.0 LOW FERRITIN: ICD-10-CM

## 2024-02-25 DIAGNOSIS — R79.89 LOW VITAMIN B12 LEVEL: ICD-10-CM

## 2024-02-25 DIAGNOSIS — Z51.81 THERAPEUTIC DRUG MONITORING: ICD-10-CM

## 2024-02-25 DIAGNOSIS — D64.9 ANEMIA, UNSPECIFIED TYPE: ICD-10-CM

## 2024-02-26 RX ORDER — HYDROXYCHLOROQUINE SULFATE 200 MG/1
200 TABLET, FILM COATED ORAL 2 TIMES DAILY
Qty: 180 TABLET | Refills: 1 | OUTPATIENT
Start: 2024-02-26

## 2024-02-26 NOTE — TELEPHONE ENCOUNTER
Future Appointments   Date Time Provider Department Center   6/6/2024  3:30 PM Virginia Salinas MD [facility] [address]     Last office visit: 2/1/2024    Last fill: 2/23/2024 180 tab, 1 refill    Sending pt my chart message stating that 6 month script sent to pharmacy on 2/23/2024

## 2024-03-01 DIAGNOSIS — Z51.81 THERAPEUTIC DRUG MONITORING: ICD-10-CM

## 2024-03-01 DIAGNOSIS — R79.0 LOW FERRITIN: ICD-10-CM

## 2024-03-01 DIAGNOSIS — M05.79 RHEUMATOID ARTHRITIS INVOLVING MULTIPLE SITES WITH POSITIVE RHEUMATOID FACTOR (HCC): ICD-10-CM

## 2024-03-01 DIAGNOSIS — G57.63 MORTON'S NEUROMA OF BOTH FEET: ICD-10-CM

## 2024-03-04 DIAGNOSIS — M05.79 RHEUMATOID ARTHRITIS INVOLVING MULTIPLE SITES WITH POSITIVE RHEUMATOID FACTOR (HCC): ICD-10-CM

## 2024-03-04 DIAGNOSIS — G57.63 MORTON'S NEUROMA OF BOTH FEET: ICD-10-CM

## 2024-03-04 DIAGNOSIS — Z51.81 THERAPEUTIC DRUG MONITORING: ICD-10-CM

## 2024-03-04 DIAGNOSIS — R79.0 LOW FERRITIN: ICD-10-CM

## 2024-03-04 RX ORDER — MELOXICAM 15 MG/1
15 TABLET ORAL DAILY
Qty: 30 TABLET | Refills: 0 | OUTPATIENT
Start: 2024-03-04

## 2024-03-04 NOTE — TELEPHONE ENCOUNTER
Future Appointments   Date Time Provider Department Center   6/6/2024  3:30 PM Virginia Salinas MD [facility] [address]     Last office visit: 2/1/2024    Per last office visit, pt to wean off medication. Refill request declined.

## 2024-03-05 RX ORDER — MELOXICAM 15 MG/1
15 TABLET ORAL DAILY
Qty: 30 TABLET | Refills: 0 | OUTPATIENT
Start: 2024-03-05

## 2024-03-12 DIAGNOSIS — M05.79 RHEUMATOID ARTHRITIS INVOLVING MULTIPLE SITES WITH POSITIVE RHEUMATOID FACTOR (HCC): ICD-10-CM

## 2024-03-12 DIAGNOSIS — R79.0 LOW FERRITIN: ICD-10-CM

## 2024-03-12 DIAGNOSIS — G57.63 MORTON'S NEUROMA OF BOTH FEET: ICD-10-CM

## 2024-03-12 DIAGNOSIS — Z51.81 THERAPEUTIC DRUG MONITORING: ICD-10-CM

## 2024-03-12 DIAGNOSIS — D64.9 ANEMIA, UNSPECIFIED TYPE: ICD-10-CM

## 2024-03-12 DIAGNOSIS — R79.89 LOW VITAMIN B12 LEVEL: ICD-10-CM

## 2024-03-12 RX ORDER — METHOTREXATE 2.5 MG/1
25 TABLET ORAL WEEKLY
Qty: 130 TABLET | Refills: 0 | Status: SHIPPED | OUTPATIENT
Start: 2024-03-12 | End: 2024-06-11

## 2024-03-12 RX ORDER — MELOXICAM 15 MG/1
15 TABLET ORAL DAILY
Qty: 30 TABLET | Refills: 0 | Status: SHIPPED | OUTPATIENT
Start: 2024-03-12

## 2024-03-12 NOTE — TELEPHONE ENCOUNTER
Pt called office requesting refill of Methotrexate and Meloxicam     LOV: 2/1/24  Future Appointments   Date Time Provider Department Center   6/6/2024  3:30 PM Virginia Salinas MD [facility] [address]   LABS:  Component      Latest Ref Rng 2/9/2024   WBC      4.0 - 11.0 x10(3) uL 7.3    RBC      3.80 - 5.30 x10(6)uL 3.82    Hemoglobin      12.0 - 16.0 g/dL 11.7 (L)    Hematocrit      35.0 - 48.0 % 36.2    Platelet Count      150.0 - 450.0 10(3)uL 226.0    MCV      80.0 - 100.0 fL 94.8    MCH      26.0 - 34.0 pg 30.6    MCHC      31.0 - 37.0 g/dL 32.3    RDW      % 13.3    Prelim Neutrophil Abs      1.50 - 7.70 x10 (3) uL 4.97    Neutrophils Absolute      1.50 - 7.70 x10(3) uL 4.97    Lymphocytes Absolute      1.00 - 4.00 x10(3) uL 1.61    Monocytes Absolute      0.10 - 1.00 x10(3) uL 0.58    Eosinophils Absolute      0.00 - 0.70 x10(3) uL 0.11    Basophils Absolute      0.00 - 0.20 x10(3) uL 0.03    Immature Granulocyte Absolute      0.00 - 1.00 x10(3) uL 0.02    Neutrophils %      % 67.9    Lymphocytes %      % 22.0    Monocytes %      % 7.9    Eosinophils %      % 1.5    Basophils %      % 0.4    Immature Granulocyte %      % 0.3    Glucose      70 - 99 mg/dL 92    Sodium      136 - 145 mmol/L 142    Potassium      3.5 - 5.1 mmol/L 4.3    Chloride      98 - 112 mmol/L 109    Carbon Dioxide, Total      21.0 - 32.0 mmol/L 28.0    ANION GAP      0 - 18 mmol/L 5    BUN      9 - 23 mg/dL 10    CREATININE      0.55 - 1.02 mg/dL 0.85    CALCIUM      8.5 - 10.1 mg/dL 9.2    CALCULATED OSMOLALITY      275 - 295 mOsm/kg 293    EGFR      >=60 mL/min/1.73m2 82    AST (SGOT)      15 - 37 U/L 23    ALT (SGPT)      13 - 56 U/L 20    ALKALINE PHOSPHATASE      41 - 108 U/L 96    Total Bilirubin      0.1 - 2.0 mg/dL 0.4    PROTEIN, TOTAL      6.4 - 8.2 g/dL 7.3    Albumin      3.4 - 5.0 g/dL 3.8    Globulin      2.8 - 4.4 g/dL 3.5    A/G Ratio      1.0 - 2.0  1.1    Patient Fasting for CMP? Yes    Vitamin B12      134 - 183  pg/mL 1,565 (H)       Legend:  (L) Low  (H) High

## 2024-03-13 ENCOUNTER — TELEPHONE (OUTPATIENT)
Dept: RHEUMATOLOGY | Facility: CLINIC | Age: 54
End: 2024-03-13

## 2024-03-13 ENCOUNTER — PATIENT MESSAGE (OUTPATIENT)
Dept: RHEUMATOLOGY | Facility: CLINIC | Age: 54
End: 2024-03-13

## 2024-03-13 NOTE — TELEPHONE ENCOUNTER
From: Paz Ocampo  To: Virginia Salinas  Sent: 3/13/2024 1:48 PM CDT  Subject: Job description    Are you able to write a note for any restrictions that I may have?     Required skills and experience:  *A love for children and a strong desire to make a difference everyday   *Ability to build relationships with families and coworkers and create a dynamic environment where play and discovery go hand in hand  *Outstanding customer service skills  *Cpr and first aid certification or willingness to obtain  *Must meet state specific guidelines for the role  *Must be physically able to use a computer with basic proficiency, lift a minimum of 40 pounds, and work indoors or outdoors. Must be able to assume postures in low levels to allow physical and visual contact with children, see and hear well enough to keep children safe, and engage in physical activity with children   *Ability to speak, read, and write English     Thank you

## 2024-04-08 DIAGNOSIS — M05.79 RHEUMATOID ARTHRITIS INVOLVING MULTIPLE SITES WITH POSITIVE RHEUMATOID FACTOR (HCC): ICD-10-CM

## 2024-04-08 DIAGNOSIS — R79.89 LOW VITAMIN B12 LEVEL: ICD-10-CM

## 2024-04-08 DIAGNOSIS — R79.0 LOW FERRITIN: ICD-10-CM

## 2024-04-08 DIAGNOSIS — D64.9 ANEMIA, UNSPECIFIED TYPE: ICD-10-CM

## 2024-04-09 RX ORDER — METHOTREXATE 2.5 MG/1
25 TABLET ORAL WEEKLY
Qty: 130 TABLET | Refills: 0 | OUTPATIENT
Start: 2024-04-09 | End: 2024-07-09

## 2024-04-09 NOTE — TELEPHONE ENCOUNTER
Last office visit: 2/1/2024    Next Rheum Apt:6/6/2024 Virginia Salinas MD    Last fill: 3/12/2024 130 tab, 0 refills    Sending pt my chart message stating 90 day script sent on 3/12/2024    Labs:   Lab Results   Component Value Date    CREATSERUM 0.85 02/09/2024    ALKPHO 96 02/09/2024    AST 23 02/09/2024    ALT 20 02/09/2024    BILT 0.4 02/09/2024    TP 7.3 02/09/2024    ALB 3.8 02/09/2024       Lab Results   Component Value Date    WBC 7.3 02/09/2024    HGB 11.7 (L) 02/09/2024    .0 02/09/2024    NEPRELIM 4.97 02/09/2024    NEPERCENT 67.9 02/09/2024    LYPERCENT 22.0 02/09/2024    NE 4.97 02/09/2024    LYMABS 1.61 02/09/2024

## 2024-04-23 DIAGNOSIS — R79.0 LOW FERRITIN: ICD-10-CM

## 2024-04-23 DIAGNOSIS — Z51.81 THERAPEUTIC DRUG MONITORING: ICD-10-CM

## 2024-04-23 DIAGNOSIS — G57.63 MORTON'S NEUROMA OF BOTH FEET: ICD-10-CM

## 2024-04-23 DIAGNOSIS — M05.79 RHEUMATOID ARTHRITIS INVOLVING MULTIPLE SITES WITH POSITIVE RHEUMATOID FACTOR (HCC): ICD-10-CM

## 2024-04-24 DIAGNOSIS — G57.63 MORTON'S NEUROMA OF BOTH FEET: ICD-10-CM

## 2024-04-24 DIAGNOSIS — M05.79 RHEUMATOID ARTHRITIS INVOLVING MULTIPLE SITES WITH POSITIVE RHEUMATOID FACTOR (HCC): ICD-10-CM

## 2024-04-24 DIAGNOSIS — R79.0 LOW FERRITIN: ICD-10-CM

## 2024-04-24 DIAGNOSIS — Z51.81 THERAPEUTIC DRUG MONITORING: ICD-10-CM

## 2024-04-24 RX ORDER — MELOXICAM 15 MG/1
15 TABLET ORAL DAILY
Qty: 30 TABLET | Refills: 0 | Status: SHIPPED | OUTPATIENT
Start: 2024-04-24

## 2024-04-24 NOTE — TELEPHONE ENCOUNTER
Future Appointments   Date Time Provider Department Center   6/6/2024  3:30 PM Virginia Salinas MD [facility] [address]     Last office visit: 2/1/2024    Last fill: 3/12/2024 30 tab, 0 refills

## 2024-04-25 RX ORDER — MELOXICAM 15 MG/1
15 TABLET ORAL DAILY
Qty: 30 TABLET | Refills: 0 | OUTPATIENT
Start: 2024-04-25

## 2024-05-10 ENCOUNTER — PATIENT MESSAGE (OUTPATIENT)
Dept: RHEUMATOLOGY | Facility: CLINIC | Age: 54
End: 2024-05-10

## 2024-05-10 DIAGNOSIS — R79.0 LOW FERRITIN: Primary | ICD-10-CM

## 2024-05-10 RX ORDER — FERROUS SULFATE 325(65) MG
325 TABLET ORAL
Qty: 90 TABLET | Refills: 0 | Status: SHIPPED | OUTPATIENT
Start: 2024-05-10

## 2024-05-10 NOTE — TELEPHONE ENCOUNTER
Future Appointments   Date Time Provider Department Center   6/6/2024  3:30 PM Virginia Salinas MD [facility] [address]     Last office visit: 2/1/2024    Last fill: 2/12/2023 90 tab, 0 refills    Pended 90 day supply

## 2024-05-10 NOTE — TELEPHONE ENCOUNTER
From: Paz Ocampo  To: Virginia Salinas  Sent: 5/10/2024 12:45 PM CDT  Subject: Rifle medication     Hi   I finished my iron meditation   Can you please make rifle for me.

## 2024-05-30 DIAGNOSIS — Z51.81 THERAPEUTIC DRUG MONITORING: ICD-10-CM

## 2024-05-30 DIAGNOSIS — G57.63 MORTON'S NEUROMA OF BOTH FEET: ICD-10-CM

## 2024-05-30 DIAGNOSIS — R79.0 LOW FERRITIN: ICD-10-CM

## 2024-05-30 DIAGNOSIS — M05.79 RHEUMATOID ARTHRITIS INVOLVING MULTIPLE SITES WITH POSITIVE RHEUMATOID FACTOR (HCC): ICD-10-CM

## 2024-05-31 RX ORDER — MELOXICAM 15 MG/1
15 TABLET ORAL DAILY
Qty: 30 TABLET | Refills: 0 | Status: SHIPPED | OUTPATIENT
Start: 2024-05-31

## 2024-05-31 NOTE — TELEPHONE ENCOUNTER
Future Appointments   Date Time Provider Department Center   6/6/2024  3:30 PM Virginia Salinas MD [facility] [address]     Last office visit: 2/1/2024    Last fill: 4/24/2024 30 tab, 0 refills

## 2024-05-31 NOTE — TELEPHONE ENCOUNTER
Spoke to patient regarding the below message:    Call pt, needs labs NOW       Patient verbalized understanding and stated that she would go and have her labs done as soon as she can go.

## 2024-06-03 ENCOUNTER — LAB ENCOUNTER (OUTPATIENT)
Dept: LAB | Age: 54
End: 2024-06-03
Attending: INTERNAL MEDICINE
Payer: COMMERCIAL

## 2024-06-03 DIAGNOSIS — R79.0 LOW FERRITIN: ICD-10-CM

## 2024-06-03 DIAGNOSIS — M05.79 RHEUMATOID ARTHRITIS INVOLVING MULTIPLE SITES WITH POSITIVE RHEUMATOID FACTOR (HCC): ICD-10-CM

## 2024-06-03 LAB
ALBUMIN SERPL-MCNC: 3.8 G/DL (ref 3.4–5)
ALBUMIN/GLOB SERPL: 1.1 {RATIO} (ref 1–2)
ALP LIVER SERPL-CCNC: 98 U/L
ALT SERPL-CCNC: 26 U/L
ANION GAP SERPL CALC-SCNC: 4 MMOL/L (ref 0–18)
AST SERPL-CCNC: 24 U/L (ref 15–37)
BASOPHILS # BLD AUTO: 0.03 X10(3) UL (ref 0–0.2)
BASOPHILS NFR BLD AUTO: 0.6 %
BILIRUB SERPL-MCNC: 0.5 MG/DL (ref 0.1–2)
BUN BLD-MCNC: 10 MG/DL (ref 9–23)
CALCIUM BLD-MCNC: 9.5 MG/DL (ref 8.5–10.1)
CHLORIDE SERPL-SCNC: 110 MMOL/L (ref 98–112)
CO2 SERPL-SCNC: 28 MMOL/L (ref 21–32)
CREAT BLD-MCNC: 0.68 MG/DL
EGFRCR SERPLBLD CKD-EPI 2021: 105 ML/MIN/1.73M2 (ref 60–?)
EOSINOPHIL # BLD AUTO: 0.11 X10(3) UL (ref 0–0.7)
EOSINOPHIL NFR BLD AUTO: 2.2 %
ERYTHROCYTE [DISTWIDTH] IN BLOOD BY AUTOMATED COUNT: 14.8 %
FASTING STATUS PATIENT QL REPORTED: NO
GLOBULIN PLAS-MCNC: 3.5 G/DL (ref 2.8–4.4)
GLUCOSE BLD-MCNC: 93 MG/DL (ref 70–99)
HCT VFR BLD AUTO: 37.8 %
HGB BLD-MCNC: 12.5 G/DL
IMM GRANULOCYTES # BLD AUTO: 0.01 X10(3) UL (ref 0–1)
IMM GRANULOCYTES NFR BLD: 0.2 %
LYMPHOCYTES # BLD AUTO: 1.7 X10(3) UL (ref 1–4)
LYMPHOCYTES NFR BLD AUTO: 33.6 %
MCH RBC QN AUTO: 31.2 PG (ref 26–34)
MCHC RBC AUTO-ENTMCNC: 33.1 G/DL (ref 31–37)
MCV RBC AUTO: 94.3 FL
MONOCYTES # BLD AUTO: 0.41 X10(3) UL (ref 0.1–1)
MONOCYTES NFR BLD AUTO: 8.1 %
NEUTROPHILS # BLD AUTO: 2.8 X10 (3) UL (ref 1.5–7.7)
NEUTROPHILS # BLD AUTO: 2.8 X10(3) UL (ref 1.5–7.7)
NEUTROPHILS NFR BLD AUTO: 55.3 %
OSMOLALITY SERPL CALC.SUM OF ELEC: 293 MOSM/KG (ref 275–295)
PLATELET # BLD AUTO: 249 10(3)UL (ref 150–450)
POTASSIUM SERPL-SCNC: 4.2 MMOL/L (ref 3.5–5.1)
PROT SERPL-MCNC: 7.3 G/DL (ref 6.4–8.2)
RBC # BLD AUTO: 4.01 X10(6)UL
SODIUM SERPL-SCNC: 142 MMOL/L (ref 136–145)
WBC # BLD AUTO: 5.1 X10(3) UL (ref 4–11)

## 2024-06-03 PROCEDURE — 80053 COMPREHEN METABOLIC PANEL: CPT

## 2024-06-03 PROCEDURE — 85025 COMPLETE CBC W/AUTO DIFF WBC: CPT

## 2024-06-03 PROCEDURE — 36415 COLL VENOUS BLD VENIPUNCTURE: CPT

## 2024-06-06 ENCOUNTER — TELEPHONE (OUTPATIENT)
Dept: RHEUMATOLOGY | Facility: CLINIC | Age: 54
End: 2024-06-06

## 2024-06-06 ENCOUNTER — OFFICE VISIT (OUTPATIENT)
Dept: RHEUMATOLOGY | Facility: CLINIC | Age: 54
End: 2024-06-06
Payer: COMMERCIAL

## 2024-06-06 VITALS
SYSTOLIC BLOOD PRESSURE: 108 MMHG | HEIGHT: 65.75 IN | HEART RATE: 66 BPM | TEMPERATURE: 98 F | BODY MASS INDEX: 27 KG/M2 | WEIGHT: 166 LBS | DIASTOLIC BLOOD PRESSURE: 60 MMHG | RESPIRATION RATE: 16 BRPM | OXYGEN SATURATION: 96 %

## 2024-06-06 DIAGNOSIS — M05.79 RHEUMATOID ARTHRITIS INVOLVING MULTIPLE SITES WITH POSITIVE RHEUMATOID FACTOR (HCC): Primary | ICD-10-CM

## 2024-06-06 DIAGNOSIS — Z79.899 IMMUNODEFICIENCY DUE TO DRUG THERAPY (HCC): ICD-10-CM

## 2024-06-06 DIAGNOSIS — D84.821 IMMUNODEFICIENCY DUE TO DRUG THERAPY (HCC): ICD-10-CM

## 2024-06-06 DIAGNOSIS — Z51.81 THERAPEUTIC DRUG MONITORING: ICD-10-CM

## 2024-06-06 PROCEDURE — G2211 COMPLEX E/M VISIT ADD ON: HCPCS | Performed by: INTERNAL MEDICINE

## 2024-06-06 PROCEDURE — 99214 OFFICE O/P EST MOD 30 MIN: CPT | Performed by: INTERNAL MEDICINE

## 2024-06-06 RX ORDER — MEDROXYPROGESTERONE ACETATE 150 MG/ML
INJECTION, SUSPENSION INTRAMUSCULAR
COMMUNITY
Start: 2024-05-17

## 2024-06-06 NOTE — PROGRESS NOTES
Rheumatology f/u Patient Note  =====================================================================================================    Chief complaint: seropositive RA  Chief Complaint   Patient presents with    Rheumatoid Arthritis     4 month f/u. Feeling good. Some knee pain that comes and goes. Hard to stand and sit for a long time. Converted rapid score of 1.0     PCP  Katerine Pa MD  Phone: [phone]    =====================================================================================================  HPI Date of visit: 11/9/2022    Paz Ocampo is a 53 year old female     Patient here for evaluation of a positive rheumatoid factor and suspected rheumatoid arthritis.  Patient is here with  who is helping to translate from [place]ian.  Patient has a history of chronic polyarthralgia for the last 1 to 2 years.  All joints are affected.  She has received annual cortisone injections to the left knee which has helped.  She has received 2 injections of the knee in the last several months.  -She was found to have a positive rheumatoid factor in summer 2022 and was diagnosed by her PCP with rheumatoid arthritis.  She has been on hydroxychloroquine and a low-dose prednisone for the time being which has been somewhat helpful.  However, her pain remains quite severe.  -Works at [employer].  Work is difficult.  Hard to grasp objects  -Denies any shortness of breath or any other extra-articular symptoms.  Denies any rash.  -4 children.  No miscarriages  -Denies any Raynaud's, dry eyes/mouth  Medications:  hydroxychloroquine (plaquenil) 200 mg daily for a few months  Prednisone 2.5 mg daily   Ibuprofen 400 mg BID prn  ==============================================================================================================  Visit: 10/05/23  She is 70% of her baseline.  More pain in the neck starting yesterday. On the right  Patient self-discontinued leflunomide 10 mg daily since last visit  Thinking about  Enbrel.  Family members on Enbrel for unclear indication  -Left knee pain persists.  -Bilateral foot pain persists; but better  -Tapering prednisone and doing well with this  Medications/therapies:  methotrexate 25 mg weekly on Sunday  Folic acid  hydroxychloroquine (plaquenil) 200 mg twice daily   Prednisone 1 mg daily  ==============================================================================================================  Visit: 02/01/24  Bilateral knee pain is noted. Particularly of the left knee.  Otherwise joints are good.  Enbrel was approved, however patient did not call to schedule shipping.  -Tapered off prednisone since last visit  Medications/therapies:  methotrexate 25 mg weekly on Sunday  Folic acid  hydroxychloroquine (plaquenil) 200 mg twice daily   ==============================================================================================================  Today's Visit: 06/06/24    Pain is good outside of the left knee.  No significant improvement noted after starting Enbrel.    Medications/therapies:  methotrexate 25 mg weekly on Sunday  Folic acid  hydroxychloroquine (plaquenil) 200 mg twice daily   Enbrel weekly    5 point ROS negative except noted above  I had reviewed past medical and family histories together with allergy and medication lists documented.      Medications:  Current Outpatient Medications on File Prior to Visit   Medication Sig Dispense Refill    ENBREL SURECLICK 50 MG/ML Subcutaneous Solution Auto-injector       Meloxicam 15 MG Oral Tab Take 1 tablet (15 mg total) by mouth daily. 30 tablet 0    Ferrous Sulfate 325 (65 Fe) MG Oral Tab Take 1 tablet (325 mg total) by mouth daily with breakfast. 90 tablet 0    methotrexate 2.5 MG Oral Tab Take 10 tablets (25 mg total) by mouth once a week. 130 tablet 0    hydroxychloroquine 200 MG Oral Tab Take 1 tablet (200 mg total) by mouth 2 (two) times daily. 180 tablet 1    cyanocobalamin 1000 MCG Oral Tab Take 1 tablet (1,000 mcg  total) by mouth daily. 90 tablet 3    ALPRAZolam 0.25 MG Oral Tab Take 1 tablet (0.25 mg total) by mouth nightly as needed. 15 tablet 0    folic acid 1 MG Oral Tab Take 1 tablet (1 mg total) by mouth daily. 90 tablet 3    Acetaminophen (TYLENOL OR)       cholecalciferol 50 MCG (2000 UT) Oral Cap Take 1 capsule (2,000 Units total) by mouth daily. (Patient not taking: Reported on 6/6/2024)       No current facility-administered medications on file prior to visit.         Allergies:  No Known Allergies      Objective    Vitals:    06/06/24 1526   BP: 108/60   Pulse: 66   Resp: 16   Temp: 98.3 °F (36.8 °C)   SpO2: 96%   Weight: 166 lb (75.3 kg)   Height: 5' 5.75\" (1.67 m)     GEN: NAD, well-nourished.   HEENT: Head: NCAT. Face: No lesions. Eyes: Conjunctiva clear. Sclera are anicteric. PERRLA. EOMs are full.   CV: RRR, no mrg, S1/S2  PULM:  CTAB, easy effort  Extremities: No cyanosis, edema or deformities.   Neurologic: Strength, CN2-12 grossly intact   Psych: normal affect.   Skin: No lesions or rashes.  MSK: 28 joint count performed. No evidence of synovitis in mcp, pip, dip, wrist, elbows, shoulders, hips, knees, ankles, mtp unless otherwise noted. Full ROM of elbows, wrists, knees.       PtGA: 0  SJ: 0  TJ: 1 (L knee)  MDGA: 0    Left greater than right knee OA changes  -Bilateral MCP hypertrophy with ulnar deviation swan-neck deformities    Labs:  Additional Labs:      Lab Results   Component Value Date    WBC 5.1 06/03/2024    RBC 4.01 06/03/2024    HGB 12.5 06/03/2024    HCT 37.8 06/03/2024    .0 06/03/2024    MCV 94.3 06/03/2024    MCH 31.2 06/03/2024    MCHC 33.1 06/03/2024    RDW 14.8 06/03/2024    NEPRELIM 2.80 06/03/2024    NEPERCENT 55.3 06/03/2024    LYPERCENT 33.6 06/03/2024    MOPERCENT 8.1 06/03/2024    EOPERCENT 2.2 06/03/2024    BAPERCENT 0.6 06/03/2024    NE 2.80 06/03/2024    LYMABS 1.70 06/03/2024    MOABSO 0.41 06/03/2024    EOABSO 0.11 06/03/2024    BAABSO 0.03 06/03/2024     Lab Results    Component Value Date    GLU 93 06/03/2024    BUN 10 06/03/2024    CREATSERUM 0.68 06/03/2024    ANIONGAP 4 06/03/2024    CA 9.5 06/03/2024    OSMOCALC 293 06/03/2024    ALKPHO 98 06/03/2024    AST 24 06/03/2024    ALT 26 06/03/2024    BILT 0.5 06/03/2024    TP 7.3 06/03/2024    ALB 3.8 06/03/2024    GLOBULIN 3.5 06/03/2024     06/03/2024    K 4.2 06/03/2024     06/03/2024    CO2 28.0 06/03/2024 6/2023  Ferritin WNL  B12 WNL  Vitamin D 20.8  TSH WNL  CBC W differential WNL  Creatinine 0.72, rest of CMP WNL    3/2023  Ferritin 6.8  Percent sat 7  CBC W differential WNL besides a hemoglobin 11.3  Creatinine 0.72, rest of CMP WNL    1/2023  CBC with differential is normal, creatinine 0.67, rest of CMP is normal    12/2022  STEVEN 1: 640 homogenous  Vitamin B12 206  Uric acid 2.9  IGRA is negative  Hep B/C is negative  UPCR: 37.1/262    CRP 2.93, sed rate 75  C3/4 is negative   lupus anticoagulant, anticardiolipin IgG/IgM, beta-2 glycoprotein IgG/IgM is negative    7/24/2022  STEVEN is positive (no titer)  Extractable nuclear antigen testing by microbead assay (9 and SHELLI's tested): All negative besides chromatin  .7  Ferritin 40.24  WBC 7.3, hemoglobin 11.6, platelets 453  Creatinine 0.6, rest of CMP is normal  HCV negative  Hepatitis B surface antigen, hepatitis B surface antibody is negative  HIV is negative  Lipids are normal  PTH, TSH normal  Vitamin D 27.5      8/8/2022 D[facility]A from [facility]  Spine T score: -0.5  Left hip (T score): Femoral neck: -0.5, total hip: 0.1  Right hip (T score): Femoral neck 0.2, total hip: 0.4      Radiology:    Radiology review:      =====================================================================================================  Assessment and Plan    Assessment:  1. Rheumatoid arthritis involving multiple sites with positive rheumatoid factor (HCC)    2. Therapeutic drug monitoring    3. 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  Symptoms have been present since 2021, however irreversible articular damage findings potentially indicate chronicity beyond that period of time. Initial CDAI in 11/2022 was 61.   -Prior medications: leflunomide 10 mg daily for about a month as previously recommended.  She had no side effects but self discontinued the medication due to concerns about polypharmacy.  -Today, CDAI is 1 indicating remission, I suspect knee osteoarthritis is contributing to the patient's residual disease activity score.    #left knee osteoarthritis: Minimal benefit to corticosteroid injections in the past of which she has received four of.    #Positive STEVEN, positive chromatin: Chromatin can be seen in SLE, however is not part of SLE classification criteria.  No extra-articular features to suggest SLE or another autoimmune disease    Plan:  Our nurse will call about the work letter. 20 maximum weight limit.     Repeat blood work in 3 months.     -continue methotrexate 25 mg (10 tablets) weekly  -continue folic acid daily  -continue hydroxychloroquine (plaquenil) 200 mg twice daily;     I would try to decrease enbrel to every 2 weeks; consider stopping in future    Try decreasing meloxicam to 1/2 tablet daily with food; try stopping in future    Purchase Voltaren (diclofenac) 1% gel over-the-counter. It is in an orange packaging.     Discussed physical therapy for left knee osteoarthritis.  Patient would like to defer for now.    End of 2024, consider slowly decreasing dose of methotrexate per patient request.       GERD: every day PPI    Vaccinations needed given your RA and methotrexate therapy.   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      The above plan of care, diagnosis, orders, and follow-up were discussed with the patient. Questions related to this recommended plan of care were answered.    Thank you for referring this delightful patient to me. Please feel free to contact me with any questions.     This report was performed utilizing speech recognition software technology. Despite proofreading, speech recognition errors could escape detection. If a word or phrase is confusing or out of context, please do not hesitate to call for   clarification.       Kind regards      Virginia Salinas MD  [facility]

## 2024-06-06 NOTE — PATIENT INSTRUCTIONS
Our nurse will call about the work letter. 20 maximum weight limit.     Repeat blood work in 3 months.     -continue methotrexate 25 mg (10 tablets) weekly  -continue folic acid daily  -continue hydroxychloroquine (plaquenil) 200 mg twice daily;     I would try to decrease enbrel to every 2 weeks    Try decreasing meloxicam to 1/2 tablet daily with food.    Purchase Voltaren (diclofenac) 1% gel over-the-counter. It is in an orange packaging.     Total dose should not exceed 32 g per day, overall affected joints. Voltaren Gel should  be measured onto the enclosed dosing card to the appropriate 2 g or 4 g designation.   Knees: Apply the gel (4 g) to the affected area up to 4 times daily. Do not apply  more than 16 g daily to any one affected joint of the lower extremities.   Hands, wrists, elbows: Apply the gel (2 g) to the affected area up to 4 times daily. Do not apply  more than 8 g daily to any one affected joint of the upper extremities    ==============================================================================================================           [place] [employer]:  https://www.bls.gov/ors/factsheet/strength.htm

## 2024-06-07 NOTE — TELEPHONE ENCOUNTER
Call or message patient.  Patient would like a work letter to be drafted.  We discussed adding a 20 pound lift/carry limit.  See if there is any other specifics the patient would like to be added on for her job in the .

## 2024-06-11 ENCOUNTER — TELEPHONE (OUTPATIENT)
Dept: RHEUMATOLOGY | Facility: CLINIC | Age: 54
End: 2024-06-11

## 2024-06-11 NOTE — PROGRESS NOTES
Phoned pt regarding work letter accommodations. Pt requesting to lift no more than 25lbs and to be able to sit when needed. Letter pended

## 2024-06-12 RX ORDER — MEDROXYPROGESTERONE ACETATE 150 MG/ML
50 INJECTION, SUSPENSION INTRAMUSCULAR
Qty: 4 ML | Refills: 5 | Status: SHIPPED | OUTPATIENT
Start: 2024-06-12

## 2024-06-12 NOTE — TELEPHONE ENCOUNTER
Enbrel was filled by [facility] yesterday. Script was from 02/14/24. Likely no more refills on file and is why we were sent a request.     This refill request is being sent to the provider for the following reason:  []Patient has not had an appointment within the past 12 months but has made an appointment on: ___  [x]Medication is not within protocol  []Patient did not complete follow up recommendations  []Other: ___    LOV: 6/6/24  Future Appointments   Date Time Provider Department Center   10/3/2024  3:20 PM Virginia Salinas MD [facility] [address]   LABS:  Component      Latest Ref Rng 6/3/2024   WBC      4.0 - 11.0 x10(3) uL 5.1    RBC      3.80 - 5.30 x10(6)uL 4.01    Hemoglobin      12.0 - 16.0 g/dL 12.5    Hematocrit      35.0 - 48.0 % 37.8    Platelet Count      150.0 - 450.0 10(3)uL 249.0    MCV      80.0 - 100.0 fL 94.3    MCH      26.0 - 34.0 pg 31.2    MCHC      31.0 - 37.0 g/dL 33.1    RDW      % 14.8    Prelim Neutrophil Abs      1.50 - 7.70 x10 (3) uL 2.80    Neutrophils Absolute      1.50 - 7.70 x10(3) uL 2.80    Lymphocytes Absolute      1.00 - 4.00 x10(3) uL 1.70    Monocytes Absolute      0.10 - 1.00 x10(3) uL 0.41    Eosinophils Absolute      0.00 - 0.70 x10(3) uL 0.11    Basophils Absolute      0.00 - 0.20 x10(3) uL 0.03    Immature Granulocyte Absolute      0.00 - 1.00 x10(3) uL 0.01    Neutrophils %      % 55.3    Lymphocytes %      % 33.6    Monocytes %      % 8.1    Eosinophils %      % 2.2    Basophils %      % 0.6    Immature Granulocyte %      % 0.2    Glucose      70 - 99 mg/dL 93    Sodium      136 - 145 mmol/L 142    Potassium      3.5 - 5.1 mmol/L 4.2    Chloride      98 - 112 mmol/L 110    Carbon Dioxide, Total      21.0 - 32.0 mmol/L 28.0    ANION GAP      0 - 18 mmol/L 4    BUN      9 - 23 mg/dL 10    CREATININE      0.55 - 1.02 mg/dL 0.68    CALCIUM      8.5 - 10.1 mg/dL 9.5    CALCULATED OSMOLALITY      275 - 295 mOsm/kg 293    EGFR      >=60 mL/min/1.73m2 105     AST (SGOT)      15 - 37 U/L 24    ALT (SGPT)      13 - 56 U/L 26    ALKALINE PHOSPHATASE      41 - 108 U/L 98    Total Bilirubin      0.1 - 2.0 mg/dL 0.5    PROTEIN, TOTAL      6.4 - 8.2 g/dL 7.3    Albumin      3.4 - 5.0 g/dL 3.8    Globulin      2.8 - 4.4 g/dL 3.5    A/G Ratio      1.0 - 2.0  1.1    Patient Fasting for CMP? No

## 2024-06-14 DIAGNOSIS — R79.89 LOW VITAMIN B12 LEVEL: ICD-10-CM

## 2024-06-14 DIAGNOSIS — R79.0 LOW FERRITIN: ICD-10-CM

## 2024-06-14 DIAGNOSIS — M05.79 RHEUMATOID ARTHRITIS INVOLVING MULTIPLE SITES WITH POSITIVE RHEUMATOID FACTOR (HCC): ICD-10-CM

## 2024-06-14 DIAGNOSIS — D64.9 ANEMIA, UNSPECIFIED TYPE: ICD-10-CM

## 2024-06-17 RX ORDER — METHOTREXATE 2.5 MG/1
TABLET ORAL
Qty: 130 TABLET | Refills: 0 | Status: SHIPPED | OUTPATIENT
Start: 2024-06-17

## 2024-06-17 NOTE — TELEPHONE ENCOUNTER
LOV: 6/6/2024    RTC: 4 months   Future Appointments   Date Time Provider Department Center   10/3/2024  3:20 PM Virginia Salinas MD [facility] [address]   LABS:   Component      Latest Ref Rng 6/3/2024   WBC      4.0 - 11.0 x10(3) uL 5.1    RBC      3.80 - 5.30 x10(6)uL 4.01    Hemoglobin      12.0 - 16.0 g/dL 12.5    Hematocrit      35.0 - 48.0 % 37.8    Platelet Count      150.0 - 450.0 10(3)uL 249.0    MCV      80.0 - 100.0 fL 94.3    MCH      26.0 - 34.0 pg 31.2    MCHC      31.0 - 37.0 g/dL 33.1    RDW      % 14.8    Prelim Neutrophil Abs      1.50 - 7.70 x10 (3) uL 2.80    Neutrophils Absolute      1.50 - 7.70 x10(3) uL 2.80    Lymphocytes Absolute      1.00 - 4.00 x10(3) uL 1.70    Monocytes Absolute      0.10 - 1.00 x10(3) uL 0.41    Eosinophils Absolute      0.00 - 0.70 x10(3) uL 0.11    Basophils Absolute      0.00 - 0.20 x10(3) uL 0.03    Immature Granulocyte Absolute      0.00 - 1.00 x10(3) uL 0.01    Neutrophils %      % 55.3    Lymphocytes %      % 33.6    Monocytes %      % 8.1    Eosinophils %      % 2.2    Basophils %      % 0.6    Immature Granulocyte %      % 0.2    Glucose      70 - 99 mg/dL 93    Sodium      136 - 145 mmol/L 142    Potassium      3.5 - 5.1 mmol/L 4.2    Chloride      98 - 112 mmol/L 110    Carbon Dioxide, Total      21.0 - 32.0 mmol/L 28.0    ANION GAP      0 - 18 mmol/L 4    BUN      9 - 23 mg/dL 10    CREATININE      0.55 - 1.02 mg/dL 0.68    CALCIUM      8.5 - 10.1 mg/dL 9.5    CALCULATED OSMOLALITY      275 - 295 mOsm/kg 293    EGFR      >=60 mL/min/1.73m2 105    AST (SGOT)      15 - 37 U/L 24    ALT (SGPT)      13 - 56 U/L 26    ALKALINE PHOSPHATASE      41 - 108 U/L 98    Total Bilirubin      0.1 - 2.0 mg/dL 0.5    PROTEIN, TOTAL      6.4 - 8.2 g/dL 7.3    Albumin      3.4 - 5.0 g/dL 3.8    Globulin      2.8 - 4.4 g/dL 3.5    A/G Ratio      1.0 - 2.0  1.1    Patient Fasting for CMP? No      LAST REFIILL: 12/12/2023, Quantity 130 tablets, Refills 0    Dr Salinas-- orders  pending, approve if agreeable.

## 2024-07-01 RX ORDER — ERGOCALCIFEROL 1.25 MG/1
CAPSULE ORAL
Qty: 12 CAPSULE | Refills: 0 | OUTPATIENT
Start: 2024-07-01

## 2024-07-02 DIAGNOSIS — D64.9 ANEMIA, UNSPECIFIED TYPE: ICD-10-CM

## 2024-07-02 DIAGNOSIS — M05.79 RHEUMATOID ARTHRITIS INVOLVING MULTIPLE SITES WITH POSITIVE RHEUMATOID FACTOR (HCC): ICD-10-CM

## 2024-07-02 DIAGNOSIS — R79.89 LOW VITAMIN B12 LEVEL: ICD-10-CM

## 2024-07-02 DIAGNOSIS — R79.0 LOW FERRITIN: ICD-10-CM

## 2024-07-03 NOTE — TELEPHONE ENCOUNTER
Last office visit: 6/6/2024    Next Rheum Apt:10/3/2024 Virginia Salinas MD    Last fill: 6/17/2024 130 tab, 0 refills    Sending pt my chart message that script is at the pharmacy    Labs:   Lab Results   Component Value Date    CREATSERUM 0.68 06/03/2024    ALKPHO 98 06/03/2024    AST 24 06/03/2024    ALT 26 06/03/2024    BILT 0.5 06/03/2024    TP 7.3 06/03/2024    ALB 3.8 06/03/2024       Lab Results   Component Value Date    WBC 5.1 06/03/2024    HGB 12.5 06/03/2024    .0 06/03/2024    NEPRELIM 2.80 06/03/2024    NEPERCENT 55.3 06/03/2024    LYPERCENT 33.6 06/03/2024    NE 2.80 06/03/2024    LYMABS 1.70 06/03/2024

## 2024-07-06 DIAGNOSIS — R79.0 LOW FERRITIN: ICD-10-CM

## 2024-07-06 DIAGNOSIS — M05.79 RHEUMATOID ARTHRITIS INVOLVING MULTIPLE SITES WITH POSITIVE RHEUMATOID FACTOR (HCC): ICD-10-CM

## 2024-07-06 DIAGNOSIS — Z51.81 THERAPEUTIC DRUG MONITORING: ICD-10-CM

## 2024-07-06 DIAGNOSIS — G57.63 MORTON'S NEUROMA OF BOTH FEET: ICD-10-CM

## 2024-07-08 DIAGNOSIS — D64.9 ANEMIA, UNSPECIFIED TYPE: ICD-10-CM

## 2024-07-08 DIAGNOSIS — R79.0 LOW FERRITIN: ICD-10-CM

## 2024-07-08 DIAGNOSIS — R79.89 LOW VITAMIN B12 LEVEL: ICD-10-CM

## 2024-07-08 DIAGNOSIS — M05.79 RHEUMATOID ARTHRITIS INVOLVING MULTIPLE SITES WITH POSITIVE RHEUMATOID FACTOR (HCC): Primary | ICD-10-CM

## 2024-07-08 RX ORDER — MELOXICAM 15 MG/1
15 TABLET ORAL DAILY
Qty: 30 TABLET | Refills: 0 | Status: SHIPPED | OUTPATIENT
Start: 2024-07-08

## 2024-07-08 RX ORDER — METHOTREXATE 2.5 MG/1
TABLET ORAL
Qty: 130 TABLET | Refills: 0 | Status: SHIPPED | OUTPATIENT
Start: 2024-07-08

## 2024-07-08 NOTE — TELEPHONE ENCOUNTER
Last office visit: 6/6/2024     Next Rheum Apt:10/3/2024 Virginia Salinas MD    Last fill: 6/17/2024  130 tabs, 0 refills     Labs:   Lab Results   Component Value Date    CREATSERUM 0.68 06/03/2024    ALKPHO 98 06/03/2024    AST 24 06/03/2024    ALT 26 06/03/2024    BILT 0.5 06/03/2024    TP 7.3 06/03/2024    ALB 3.8 06/03/2024       Lab Results   Component Value Date    WBC 5.1 06/03/2024    HGB 12.5 06/03/2024    .0 06/03/2024    NEPRELIM 2.80 06/03/2024    NEPERCENT 55.3 06/03/2024    LYPERCENT 33.6 06/03/2024    NE 2.80 06/03/2024    LYMABS 1.70 06/03/2024

## 2024-07-08 NOTE — TELEPHONE ENCOUNTER
Future Appointments   Date Time Provider Department Center   10/3/2024  3:20 PM Virginia Salinas MD [facility] [address]     Last office visit: 6/6/2024    Last fill: 5/31/2024 30 tab, 0 refills

## 2024-07-11 ENCOUNTER — TELEPHONE (OUTPATIENT)
Dept: RHEUMATOLOGY | Facility: CLINIC | Age: 54
End: 2024-07-11

## 2024-07-11 NOTE — TELEPHONE ENCOUNTER
Last office visit: 6/6/2024    Next Rheum Apt:10/3/2024 Virginia Salinas MD    Last fill: 1/8/2024 90 tab, 3 refills    Refills should be at the pharmacy, faxed back to pharmacy    Labs:   Lab Results   Component Value Date    CREATSERUM 0.68 06/03/2024    ALKPHO 98 06/03/2024    AST 24 06/03/2024    ALT 26 06/03/2024    BILT 0.5 06/03/2024    TP 7.3 06/03/2024    ALB 3.8 06/03/2024       Lab Results   Component Value Date    WBC 5.1 06/03/2024    HGB 12.5 06/03/2024    .0 06/03/2024    NEPRELIM 2.80 06/03/2024    NEPERCENT 55.3 06/03/2024    LYPERCENT 33.6 06/03/2024    NE 2.80 06/03/2024    LYMABS 1.70 06/03/2024

## 2024-07-30 ENCOUNTER — TELEPHONE (OUTPATIENT)
Dept: RHEUMATOLOGY | Facility: CLINIC | Age: 54
End: 2024-07-30

## 2024-07-30 NOTE — TELEPHONE ENCOUNTER
Pt called office, pt had visit with her ophthalmologist-- states that they feel the hydroxychloroquine is too high of a dose. Pt should only take 1 tab daily. Pt states that they were suppose to send LOV note. Will check with Dr. Salinas if he received note, but advised pt to request this to be faxed to our office. Pt voices understanding.

## 2024-07-31 DIAGNOSIS — R79.0 LOW FERRITIN: ICD-10-CM

## 2024-07-31 DIAGNOSIS — M05.79 RHEUMATOID ARTHRITIS INVOLVING MULTIPLE SITES WITH POSITIVE RHEUMATOID FACTOR (HCC): ICD-10-CM

## 2024-07-31 DIAGNOSIS — Z51.81 THERAPEUTIC DRUG MONITORING: ICD-10-CM

## 2024-07-31 NOTE — TELEPHONE ENCOUNTER
Let pt know. Only a problem if at this dose for many years, such as 10+ years. Ok to continue hydroxychloroquine (plaquenil) at same dose until next RTC. Will consider tapering dose if RA is in remission.

## 2024-08-01 RX ORDER — FOLIC ACID 1 MG/1
1 TABLET ORAL DAILY
Qty: 90 TABLET | Refills: 3 | Status: SHIPPED | OUTPATIENT
Start: 2024-08-01

## 2024-08-01 RX ORDER — LIDOCAINE HYDROCHLORIDE 20 MG/ML
1 SOLUTION ORAL; TOPICAL
Qty: 90 TABLET | Refills: 0 | Status: SHIPPED | OUTPATIENT
Start: 2024-08-01

## 2024-08-01 NOTE — TELEPHONE ENCOUNTER
LOV:  06/06/2024    Future Appointments   Date Time Provider Department Center   10/3/2024  3:20 PM Virginia Salinas MD [facility] [address]       LF: Ferrous Sulfate 05/10/2024   QTY:   90   Refills:   0            Folic Acid  08/10/2024   QTY:   90   Refills:   3

## 2024-08-01 NOTE — TELEPHONE ENCOUNTER
Spoke with patient and advised her ok to continue with two tablets daily until her next appointment with Dr. Salinas in October. If her RA is in remission and she is doing well at this time he will consider decreasing the dosage. The patient verbalized understanding.

## 2024-08-05 DIAGNOSIS — M05.79 RHEUMATOID ARTHRITIS INVOLVING MULTIPLE SITES WITH POSITIVE RHEUMATOID FACTOR (HCC): ICD-10-CM

## 2024-08-05 DIAGNOSIS — Z51.81 THERAPEUTIC DRUG MONITORING: ICD-10-CM

## 2024-08-05 DIAGNOSIS — R79.0 LOW FERRITIN: ICD-10-CM

## 2024-08-05 DIAGNOSIS — G57.63 MORTON'S NEUROMA OF BOTH FEET: ICD-10-CM

## 2024-08-05 RX ORDER — MELOXICAM 15 MG/1
15 TABLET ORAL DAILY
Qty: 30 TABLET | Refills: 0 | Status: SHIPPED | OUTPATIENT
Start: 2024-08-05

## 2024-08-05 NOTE — TELEPHONE ENCOUNTER
LOV: 6/6/2024    RTC: 4 months   Future Appointments   Date Time Provider Department Center   10/3/2024  3:20 PM Virginia Salinas MD [facility] [address]   LABS:   Component      Latest Ref Rng 6/3/2024   WBC      4.0 - 11.0 x10(3) uL 5.1    RBC      3.80 - 5.30 x10(6)uL 4.01    Hemoglobin      12.0 - 16.0 g/dL 12.5    Hematocrit      35.0 - 48.0 % 37.8    Platelet Count      150.0 - 450.0 10(3)uL 249.0    MCV      80.0 - 100.0 fL 94.3    MCH      26.0 - 34.0 pg 31.2    MCHC      31.0 - 37.0 g/dL 33.1    RDW      % 14.8    Prelim Neutrophil Abs      1.50 - 7.70 x10 (3) uL 2.80    Neutrophils Absolute      1.50 - 7.70 x10(3) uL 2.80    Lymphocytes Absolute      1.00 - 4.00 x10(3) uL 1.70    Monocytes Absolute      0.10 - 1.00 x10(3) uL 0.41    Eosinophils Absolute      0.00 - 0.70 x10(3) uL 0.11    Basophils Absolute      0.00 - 0.20 x10(3) uL 0.03    Immature Granulocyte Absolute      0.00 - 1.00 x10(3) uL 0.01    Neutrophils %      % 55.3    Lymphocytes %      % 33.6    Monocytes %      % 8.1    Eosinophils %      % 2.2    Basophils %      % 0.6    Immature Granulocyte %      % 0.2    Glucose      70 - 99 mg/dL 93    Sodium      136 - 145 mmol/L 142    Potassium      3.5 - 5.1 mmol/L 4.2    Chloride      98 - 112 mmol/L 110    Carbon Dioxide, Total      21.0 - 32.0 mmol/L 28.0    ANION GAP      0 - 18 mmol/L 4    BUN      9 - 23 mg/dL 10    CREATININE      0.55 - 1.02 mg/dL 0.68    CALCIUM      8.5 - 10.1 mg/dL 9.5    CALCULATED OSMOLALITY      275 - 295 mOsm/kg 293    EGFR      >=60 mL/min/1.73m2 105    AST (SGOT)      15 - 37 U/L 24    ALT (SGPT)      13 - 56 U/L 26    ALKALINE PHOSPHATASE      41 - 108 U/L 98    Total Bilirubin      0.1 - 2.0 mg/dL 0.5    PROTEIN, TOTAL      6.4 - 8.2 g/dL 7.3    Albumin      3.4 - 5.0 g/dL 3.8    Globulin      2.8 - 4.4 g/dL 3.5    A/G Ratio      1.0 - 2.0  1.1    Patient Fasting for CMP? No      LAST REFILL: 7/8/2024, Quantity 30 tablets, Refills 0    Dr Salinas-- orders  pending, approve if agreeable.

## 2024-08-09 DIAGNOSIS — F41.9 ANXIETY: ICD-10-CM

## 2024-08-12 RX ORDER — ALPRAZOLAM 0.25 MG/1
0.25 TABLET ORAL
Qty: 15 TABLET | Refills: 0 | Status: SHIPPED | OUTPATIENT
Start: 2024-08-12

## 2024-08-12 RX ORDER — MEDROXYPROGESTERONE ACETATE 150 MG/ML
50 INJECTION, SUSPENSION INTRAMUSCULAR WEEKLY
Qty: 4 ML | Refills: 0 | Status: SHIPPED | OUTPATIENT
Start: 2024-08-12

## 2024-08-12 NOTE — TELEPHONE ENCOUNTER
Future Appointments   Date Time Provider Department Center   10/3/2024  3:20 PM Virginia Salinas MD [facility] [address]     Last office visit: 6/6/2024    Last fill: 10/5/2023 15 tab, 0 refills

## 2024-08-12 NOTE — TELEPHONE ENCOUNTER
Last office visit: 6/6/2024     Next Rheum Apt:10/3/2024 Virginia Salinas MD    Last fill: Enbrel 6/12/2024  4 ml, 5 refills     Labs:   Lab Results   Component Value Date    CREATSERUM 0.68 06/03/2024    ALKPHO 98 06/03/2024    AST 24 06/03/2024    ALT 26 06/03/2024    BILT 0.5 06/03/2024    TP 7.3 06/03/2024    ALB 3.8 06/03/2024       Lab Results   Component Value Date    WBC 5.1 06/03/2024    HGB 12.5 06/03/2024    .0 06/03/2024    NEPRELIM 2.80 06/03/2024    NEPERCENT 55.3 06/03/2024    LYPERCENT 33.6 06/03/2024    NE 2.80 06/03/2024    LYMABS 1.70 06/03/2024

## 2024-08-13 RX ORDER — METHOTREXATE 2.5 MG/1
TABLET ORAL
Qty: 130 TABLET | Refills: 0 | OUTPATIENT
Start: 2024-08-13

## 2024-09-04 DIAGNOSIS — G57.63 MORTON'S NEUROMA OF BOTH FEET: ICD-10-CM

## 2024-09-04 DIAGNOSIS — M05.79 RHEUMATOID ARTHRITIS INVOLVING MULTIPLE SITES WITH POSITIVE RHEUMATOID FACTOR (HCC): Primary | ICD-10-CM

## 2024-09-04 DIAGNOSIS — R79.0 LOW FERRITIN: ICD-10-CM

## 2024-09-04 DIAGNOSIS — Z51.81 THERAPEUTIC DRUG MONITORING: ICD-10-CM

## 2024-09-04 RX ORDER — MELOXICAM 15 MG/1
15 TABLET ORAL DAILY
Qty: 30 TABLET | Refills: 0 | Status: SHIPPED | OUTPATIENT
Start: 2024-09-04

## 2024-09-04 NOTE — TELEPHONE ENCOUNTER
Last office visit: 6/6/2024    Next Rheum Apt:10/3/2024 Virginia Salinas MD    Last fill: 8/5/2024   30 tabs, 0 refills     Labs:   Lab Results   Component Value Date    CREATSERUM 0.68 06/03/2024    ALKPHO 98 06/03/2024    AST 24 06/03/2024    ALT 26 06/03/2024    BILT 0.5 06/03/2024    TP 7.3 06/03/2024    ALB 3.8 06/03/2024       Lab Results   Component Value Date    WBC 5.1 06/03/2024    HGB 12.5 06/03/2024    .0 06/03/2024    NEPRELIM 2.80 06/03/2024    NEPERCENT 55.3 06/03/2024    LYPERCENT 33.6 06/03/2024    NE 2.80 06/03/2024    LYMABS 1.70 06/03/2024

## 2024-09-05 DIAGNOSIS — M05.79 RHEUMATOID ARTHRITIS INVOLVING MULTIPLE SITES WITH POSITIVE RHEUMATOID FACTOR (HCC): ICD-10-CM

## 2024-09-05 DIAGNOSIS — R79.89 LOW VITAMIN B12 LEVEL: ICD-10-CM

## 2024-09-06 NOTE — TELEPHONE ENCOUNTER
Future Appointments   Date Time Provider Department Center   10/3/2024  3:20 PM Virginia Salinas MD [facility] [address]     Last office visit: 6/6/2024    Last fill: 1/8/2024 90 tab, 3 refills    Sending pt my chart message that there are still fills at the pharmacy

## 2024-09-09 DIAGNOSIS — R79.89 LOW VITAMIN B12 LEVEL: Primary | ICD-10-CM

## 2024-09-09 DIAGNOSIS — M05.79 RHEUMATOID ARTHRITIS INVOLVING MULTIPLE SITES WITH POSITIVE RHEUMATOID FACTOR (HCC): ICD-10-CM

## 2024-09-09 NOTE — TELEPHONE ENCOUNTER
Last office visit: 6/6/2024    Next Rheum Apt:10/3/2024 Virginia Salinas MD    Last fill: 1/8/2024  90 tabs, 3 refills     Labs:   Lab Results   Component Value Date    CREATSERUM 0.68 06/03/2024    ALKPHO 98 06/03/2024    AST 24 06/03/2024    ALT 26 06/03/2024    BILT 0.5 06/03/2024    TP 7.3 06/03/2024    ALB 3.8 06/03/2024       Lab Results   Component Value Date    WBC 5.1 06/03/2024    HGB 12.5 06/03/2024    .0 06/03/2024    NEPRELIM 2.80 06/03/2024    NEPERCENT 55.3 06/03/2024    LYPERCENT 33.6 06/03/2024    NE 2.80 06/03/2024    LYMABS 1.70 06/03/2024

## 2024-09-19 ENCOUNTER — OFFICE VISIT (OUTPATIENT)
Dept: FAMILY MEDICINE CLINIC | Facility: CLINIC | Age: 54
End: 2024-09-19
Payer: COMMERCIAL

## 2024-09-19 ENCOUNTER — TELEPHONE (OUTPATIENT)
Dept: RHEUMATOLOGY | Facility: CLINIC | Age: 54
End: 2024-09-19

## 2024-09-19 ENCOUNTER — LAB ENCOUNTER (OUTPATIENT)
Dept: LAB | Age: 54
End: 2024-09-19
Attending: INTERNAL MEDICINE
Payer: COMMERCIAL

## 2024-09-19 VITALS
BODY MASS INDEX: 27.16 KG/M2 | WEIGHT: 167 LBS | SYSTOLIC BLOOD PRESSURE: 110 MMHG | HEART RATE: 68 BPM | RESPIRATION RATE: 16 BRPM | HEIGHT: 65.75 IN | DIASTOLIC BLOOD PRESSURE: 70 MMHG | OXYGEN SATURATION: 98 % | TEMPERATURE: 98 F

## 2024-09-19 DIAGNOSIS — Z51.81 THERAPEUTIC DRUG MONITORING: ICD-10-CM

## 2024-09-19 DIAGNOSIS — M05.79 RHEUMATOID ARTHRITIS INVOLVING MULTIPLE SITES WITH POSITIVE RHEUMATOID FACTOR (HCC): ICD-10-CM

## 2024-09-19 DIAGNOSIS — Z00.00 WELLNESS EXAMINATION: Primary | ICD-10-CM

## 2024-09-19 DIAGNOSIS — R05.1 ACUTE COUGH: ICD-10-CM

## 2024-09-19 DIAGNOSIS — Z12.11 SCREEN FOR COLON CANCER: ICD-10-CM

## 2024-09-19 DIAGNOSIS — Z12.31 SCREENING MAMMOGRAM FOR BREAST CANCER: ICD-10-CM

## 2024-09-19 DIAGNOSIS — Z00.00 WELLNESS EXAMINATION: ICD-10-CM

## 2024-09-19 PROBLEM — M06.9 RHEUMATOID ARTHRITIS INVOLVING MULTIPLE SITES (HCC): Status: ACTIVE | Noted: 2024-09-19

## 2024-09-19 LAB
ALBUMIN SERPL-MCNC: 4.4 G/DL (ref 3.2–4.8)
ALBUMIN/GLOB SERPL: 1.6 {RATIO} (ref 1–2)
ALP LIVER SERPL-CCNC: 95 U/L
ALT SERPL-CCNC: 18 U/L
ANION GAP SERPL CALC-SCNC: 5 MMOL/L (ref 0–18)
AST SERPL-CCNC: 25 U/L (ref ?–34)
BASOPHILS # BLD AUTO: 0.04 X10(3) UL (ref 0–0.2)
BASOPHILS NFR BLD AUTO: 0.8 %
BILIRUB SERPL-MCNC: 0.5 MG/DL (ref 0.3–1.2)
BUN BLD-MCNC: 11 MG/DL (ref 9–23)
CALCIUM BLD-MCNC: 9.8 MG/DL (ref 8.7–10.4)
CHLORIDE SERPL-SCNC: 107 MMOL/L (ref 98–112)
CHOLEST SERPL-MCNC: 149 MG/DL (ref ?–200)
CO2 SERPL-SCNC: 29 MMOL/L (ref 21–32)
CREAT BLD-MCNC: 0.69 MG/DL
EGFRCR SERPLBLD CKD-EPI 2021: 104 ML/MIN/1.73M2 (ref 60–?)
EOSINOPHIL # BLD AUTO: 0.22 X10(3) UL (ref 0–0.7)
EOSINOPHIL NFR BLD AUTO: 4.6 %
ERYTHROCYTE [DISTWIDTH] IN BLOOD BY AUTOMATED COUNT: 13.2 %
FASTING PATIENT LIPID ANSWER: NO
FASTING STATUS PATIENT QL REPORTED: NO
GLOBULIN PLAS-MCNC: 2.8 G/DL (ref 2–3.5)
GLUCOSE BLD-MCNC: 89 MG/DL (ref 70–99)
HCT VFR BLD AUTO: 36.6 %
HDLC SERPL-MCNC: 43 MG/DL (ref 40–59)
HGB BLD-MCNC: 12.2 G/DL
IMM GRANULOCYTES # BLD AUTO: 0.01 X10(3) UL (ref 0–1)
IMM GRANULOCYTES NFR BLD: 0.2 %
LDLC SERPL CALC-MCNC: 91 MG/DL (ref ?–100)
LYMPHOCYTES # BLD AUTO: 2.22 X10(3) UL (ref 1–4)
LYMPHOCYTES NFR BLD AUTO: 46.6 %
MCH RBC QN AUTO: 31.8 PG (ref 26–34)
MCHC RBC AUTO-ENTMCNC: 33.3 G/DL (ref 31–37)
MCV RBC AUTO: 95.3 FL
MONOCYTES # BLD AUTO: 0.43 X10(3) UL (ref 0.1–1)
MONOCYTES NFR BLD AUTO: 9 %
NEUTROPHILS # BLD AUTO: 1.84 X10 (3) UL (ref 1.5–7.7)
NEUTROPHILS # BLD AUTO: 1.84 X10(3) UL (ref 1.5–7.7)
NEUTROPHILS NFR BLD AUTO: 38.8 %
NONHDLC SERPL-MCNC: 106 MG/DL (ref ?–130)
OSMOLALITY SERPL CALC.SUM OF ELEC: 291 MOSM/KG (ref 275–295)
PLATELET # BLD AUTO: 232 10(3)UL (ref 150–450)
POTASSIUM SERPL-SCNC: 4.3 MMOL/L (ref 3.5–5.1)
PROT SERPL-MCNC: 7.2 G/DL (ref 5.7–8.2)
RBC # BLD AUTO: 3.84 X10(6)UL
SODIUM SERPL-SCNC: 141 MMOL/L (ref 136–145)
TRIGL SERPL-MCNC: 75 MG/DL (ref 30–149)
VLDLC SERPL CALC-MCNC: 12 MG/DL (ref 0–30)
WBC # BLD AUTO: 4.8 X10(3) UL (ref 4–11)

## 2024-09-19 PROCEDURE — 85025 COMPLETE CBC W/AUTO DIFF WBC: CPT

## 2024-09-19 PROCEDURE — 90715 TDAP VACCINE 7 YRS/> IM: CPT | Performed by: FAMILY MEDICINE

## 2024-09-19 PROCEDURE — 80061 LIPID PANEL: CPT

## 2024-09-19 PROCEDURE — 99386 PREV VISIT NEW AGE 40-64: CPT | Performed by: FAMILY MEDICINE

## 2024-09-19 PROCEDURE — 90707 MMR VACCINE SC: CPT | Performed by: FAMILY MEDICINE

## 2024-09-19 PROCEDURE — 99213 OFFICE O/P EST LOW 20 MIN: CPT | Performed by: FAMILY MEDICINE

## 2024-09-19 PROCEDURE — 90472 IMMUNIZATION ADMIN EACH ADD: CPT | Performed by: FAMILY MEDICINE

## 2024-09-19 PROCEDURE — 90471 IMMUNIZATION ADMIN: CPT | Performed by: FAMILY MEDICINE

## 2024-09-19 PROCEDURE — 80053 COMPREHEN METABOLIC PANEL: CPT

## 2024-09-19 PROCEDURE — 36415 COLL VENOUS BLD VENIPUNCTURE: CPT

## 2024-09-19 RX ORDER — AZITHROMYCIN 250 MG/1
TABLET, FILM COATED ORAL
Qty: 6 TABLET | Refills: 0 | Status: SHIPPED | OUTPATIENT
Start: 2024-09-19 | End: 2024-09-23

## 2024-09-19 NOTE — PATIENT INSTRUCTIONS
Thank you for choosing Fan Wiley MD at [facility]  To Do: Paz Ocampo  1. Please see age appropriate health prevention below     Call [phone] to schedule the appointment.   Please signup for [employer], which is electronic access to your record if you have not done so.  All your results will post on there.  [email or web address]   You can NOW use [employer] to book your appointments with us, or consider using open access scheduling which is through the [place] website [email or web address] and type in Fan Wiley MD and follow the links for \"Schedule Online Now\"    To schedule Imaging or tests at [place] call Central Scheduling [phone], Go to [facility] ER Building (For example: CT scans, X rays, Ultrasound, MRI)  Cardiac Testing in ER building Building A second floor Cardiac Testing [phone] (For example: Holter Monitor, Cardiac Stress tests,Event Monitor, or 2D Echocardiograms)  [facility] call [phone] usually in [facility]  Walk in Clinic in [place] at [address] Mon-Fri at 8am-7:30 p.m., and Sat/Sun 9:00a.m.-4:30 p.m.  Also at [address] [place] [address]  Call [phone] for info     Please call our office about any questions regarding your treatment/medicines/tests as a result of today's visit.  For your safety, read the entire package insert of all medicines prescribed to you and be aware of all of the risks of treatment even beyond those discussed today.  All therapies have potential risk of harm or side effects or medication interactions.  It is your duty and for your safety to discuss with the pharmacist and our office with questions, and to notify us and stop treatment if problems arise, but know that our intention is that the benefits outweigh those potential risks and we strive to make you healthier and to improve your quality of life.    Referrals, and Radiology Information:    If your insurance requires a referral to a specialist, please  allow 5 business days to process your referral request.    If Fan Wiley MD orders a CT or MRI, it may take up to 10 business days to receive approval from your insurance company. Once our office has called informing you that the insurance company approved your testing, please call Central Scheduling at [phone]  Please allow our office 5 business days to contact you regarding any testing results.    Refill policies:   Allow 3 business days for refills; controlled substances may take longer and must be picked up from the office in person.  Narcotic medications can only be filled in 30 day increments and must be refilled at an office visit only.  If your prescription is due for a refill, you may be due for a follow-up appointment.  We cannot refill your maintenance medications at a preventative wellness visit.  To best provide you care, patients receiving maintenance medications need to be seen at least twice a year.

## 2024-09-19 NOTE — H&P
Wellness Exam    REASON FOR VISIT:    Paz Ocampo is a 53 year old female who presents for an Annual Health Assessment.    Current Complaints: Ms. Ocampo is here for her wellness exam  Flu shot: see immunization record  Health Maintenance Topics with due status: Overdue       Topic Date Due    Annual Physical Never done    Colorectal Cancer Screening Never done    Pneumococcal Vaccine: Birth to 64yrs Never done    DTaP,Tdap,and Td Vaccines Never done    Zoster Vaccines 07/12/2023    TB Screen 11/09/2023    Annual Depression Screening 01/01/2024    Mammogram 03/21/2024    COVID-19 Vaccine 09/01/2024     Reported Health:   She is a very pleasant 53-year-old female with history of rheumatoid arthritis on Enbrel, methotrexate and meloxicam here today for her wellness exam and to establish care with me.  She had her Pap smear and mammogram last year which were unremarkable.  She is due to have her screening mammogram this year.  He is also due to have her screening colonoscopy.  She does have mild joint pain and takes meloxicam.  She continues to follow-up with rheumatology.  She works at  and would need to have updated MMR and Tdap.  She believes that she had those when she was in [place] growing up but does not have any records for this.  She does have a mild cough and believes that she has had stomach flu last year.  Otherwise no fever no chest pain or shortness of breath no nausea no vomiting no abdominal pain.  I had reviewed past medical and family histories together with allergy and medication lists documented.    Details about the complaints:  N/A    General Health                                         CAGE:                                 PHQ-4: Over the LAST 2 WEEKS       Depression Screening (PHQ-2/PHQ-9): Over the LAST 2 WEEKS                            PREVENTATIVE SERVICES  INDICATIONS AND SCHEDULE Recommendation Internal Lab or Procedure External Lab or Procedure   Breast Cancer Screening    Every 2 yrs age 50-74 Health Maintenance   Topic Date Due    Mammogram  03/21/2024       Pap Every 3 yrs age 21-65 or Pap and HPV every 5 yrs age 30-65 Health Maintenance   Topic Date Due    Pap Smear  03/15/2026       Chlamydia Screening Screen Annually age<25, if sex active/on OCPs; >24 high risk No results found for: \"CHLAMYDIA\"    Colonoscopy Screen Every 10 years Health Maintenance   Topic Date Due    Colorectal Cancer Screening  Never done       Flex Sigmoidoscopy Screen  Every 5 years No results found for this or any previous visit.    Fecal Occult Blood  Annually No results found for: \"FOB\", \"OCCULTSTOOL\"    Obesity Screening Screen all adults annually Body mass index is 27.16 kg/m².      Preventive Services for Which Recommendations Vary with Risk Recommendation Internal Lab or Procedure External Lab or Procedure   Cholesterol Screening Recommended screening varies with age, risk and gender LDL Cholesterol (mg/dL)   Date Value   06/27/2023 93       Diabetes Screening  if history of high blood pressure or other  risk factors HgbA1C (%)   Date Value   06/27/2023 5.7 (H)     Glucose (mg/dL)   Date Value   06/03/2024 93         Gonorrhea Screening if high risk No results found for: \"GONOCOCCUS\"    HIV Screening For all adults age 18-65, older adults at increased risk No results found for: \"HIV\"    Syphilis Screening Screen if pregnant or high risk No results found for: \"RPR\"    Hepatitis C Screening Screen those at high risk plus screen one time for adults born 1944-1 965 No results found for: \"HCVAB\"    Tuberculosis Screen if high risk No components found for: \"PPDINDURAT\"      ALLERGIES:   No Known Allergies    CURRENT MEDICATIONS:   Current Outpatient Medications   Medication Sig Dispense Refill    azithromycin (ZITHROMAX Z-DESHAUN) 250 MG Oral Tab Take 2 tablets (500 mg total) by mouth daily for 1 day, THEN 1 tablet (250 mg total) daily for 4 days. 6 tablet 0    cyanocobalamin 1000 MCG Oral Tab Take 1 tablet  (1,000 mcg total) by mouth daily. 90 tablet 3    MELOXICAM 15 MG Oral Tab TAKE 1 TABLET(15 MG) BY MOUTH DAILY 30 tablet 0    ALPRAZOLAM 0.25 MG Oral Tab TAKE 1 TABLET(0.25 MG) BY MOUTH EVERY NIGHT AS NEEDED 15 tablet 0    Etanercept (ENBREL SURECLICK) 50 MG/ML Subcutaneous Solution Auto-injector Inject 50 mg into the skin once a week. 4 mL 0    FOLIC ACID 1 MG Oral Tab TAKE 1 TABLET BY MOUTH ONCE DAILY 90 tablet 3    FEROSUL 325 (65 Fe) MG Oral Tab TAKE 1 TABLET BY MOUTH EVERY DAY WITH BREAKFAST 90 tablet 0    methotrexate 2.5 MG Oral Tab Take 10 tablets by mouth once a week 130 tablet 0    hydroxychloroquine 200 MG Oral Tab Take 1 tablet (200 mg total) by mouth 2 (two) times daily. 180 tablet 1    Acetaminophen (TYLENOL OR)       cholecalciferol 50 MCG (2000 UT) Oral Cap Take 1 capsule (2,000 Units total) by mouth daily. (Patient not taking: Reported on 2024)        MEDICAL INFORMATION:   No past medical history on file.   Past Surgical History:   Procedure Laterality Date          Other surgical history Right     carpul tunnel surgery      Family History   Problem Relation Age of Onset    Breast Cancer Other 50        PATERNAL COUSIN      SOCIAL HISTORY:   Social History     Socioeconomic History    Marital status:    Tobacco Use    Smoking status: Never    Smokeless tobacco: Never   Vaping Use    Vaping status: Never Used   Substance and Sexual Activity    Alcohol use: Never    Drug use: Yes     Types: Cocaine    Sexual activity: Yes     Partners: Male   Other Topics Concern    Caffeine Concern Yes     Comment: 1x cup daily    Exercise Yes     Comment: 2-3x week    Seat Belt Yes     Social Determinants of Health      [facility], [facility]    Housing Stability          REVIEW OF SYSTEMS:   Constitutional: Negative for fever, chills and fatigue.   HENT: Negative for hearing loss, congestion, sore throat and neck pain.    Eyes: Negative for  pain and visual disturbance.   Respiratory: Negative for  shortness of breath.    Cardiovascular: Negative for chest pain and palpitations.   Gastrointestinal: Negative for nausea, vomiting, abdominal pain and diarrhea.   Genitourinary: Negative for urgency, frequency and difficulty urinating.   Musculoskeletal: Negative for gait problem.   Skin: Negative for color change and rash.   Neurological: Negative for tremors, weakness and numbness.   Hematological: Negative for adenopathy. Does not bruise/bleed easily.   Psychiatric/Behavioral: Negative for confusion and agitation. The patient is not nervous/anxious.    EXAM:   /70   Pulse 68   Temp 98 °F (36.7 °C) (Temporal)   Resp 16   Ht 5' 5.75\" (1.67 m)   Wt 167 lb (75.8 kg)   LMP 09/19/2023   SpO2 98%   BMI 27.16 kg/m²    Patient's last menstrual period was 09/19/2023.   Constitutional: She appears her stated age, nourished, and pleasant. Vital signs reviewed as noted  Head: Normocephalic and atraumatic.   Nose: Nose normal.   Eyes: EOM are normal. Pupils are equal, round, and reactive to light. No scleral icterus.   Neck: Normal range of motion. No thyromegaly present.   Cardiovascular: Normal rate, regular rhythm and normal heart sounds.  Exam reveals no friction rub.    No murmur heard.  Pulmonary/Chest: Effort normal and breath sounds normal. She has no wheezes. She has no rales.   Abdominal: Soft. Bowel sounds are normal. There is no tenderness.   Musculoskeletal: Normal range of motion. She exhibits no edema.   Lymphadenopathy:    She has no cervical adenopathy or supraclavicular adenopathy.   Neurological: She is alert and oriented to person, place, and time. She has normal reflexes.   Skin: Skin is warm. No rash noted. No erythema. with normal hair  Psychiatric: She has a normal mood and affect and her behavior is normal.     ASSESSMENT AND OTHER RELEVANT CHRONIC CONDITIONS:   Paz Ocampo is a 53 year old female who presents for an Annual  Health Assessment.   1. Wellness examination    2. Screen for colon cancer    3. Screening mammogram for breast cancer    4. Acute cough        PLAN SUMMARY:   Paz Ocampo is a 53 year old female  Age appropriate cancer screening, labs, safety, immunizations were discussed with the patient and ordered as follows:    Paz was seen today for physical and immunization/injection.    Diagnoses and all orders for this visit:    Wellness examination  -     Lipid Panel; Future  -     Cancel: TSH and Free T4; Future    Screen for colon cancer  -     Gastro Referral - In Network    Screening mammogram for breast cancer  -     [facility] JIMMIE 2D+3D SCREENING BILAT (CPT=77067/45770); Future    Acute cough  -     azithromycin (ZITHROMAX Z-DESHAUN) 250 MG Oral Tab; Take 2 tablets (500 mg total) by mouth daily for 1 day, THEN 1 tablet (250 mg total) daily for 4 days.    Other orders  -     Cancel: MMR [59202]  -     TdaP (Adacel, Boostrix) [95160]  -     MMR [18956]    Well adult 53-year-old female with rheumatoid arthritis which seems to be stable and controlled with current meds.  Will give her Tdap and MMR vaccines today.  We shall check lipid panel  Z-Deshaun sent to the pharmacy for possible URI.  Follow-up in 1 year or as needed      This note was prepared using Dragon [employer] voice recognition dictation software. As a result errors may occur. When identified these errors have been corrected. While every attempt is made to correct errors during dictation discrepancies may still exist            Orders Placed This Encounter   Procedures    Lipid Panel    TdaP (Adacel, Boostrix) [03504]    MMR [59193]       Imaging & Consults:  GASTRO - INTERNAL  TETANUS, DIPHTHERIA TOXOIDS AND ACELLULAR PERTUSIS VACCINE (TDAP), >7 YEARS, IM USE  MMR VIRUS IMMUNIZATION  [facility] JIMMIE 2D+3D SCREENING BILAT (CPT=77067/12149)    Her 5 year prevention plan includes: annual visits for fasting labs  Health Maintenance   Topic Date Due    Annual Physical  Never done     Colorectal Cancer Screening  Never done    Pneumococcal Vaccine: Birth to 64yrs (1 of 2 - PCV) Never done    DTaP,Tdap,and Td Vaccines (1 - Tdap) Never done    Zoster Vaccines (2 of 2) 07/12/2023    TB Screen  11/09/2023    Annual Depression Screening  01/01/2024    Mammogram  03/21/2024    COVID-19 Vaccine (3 - 2023-24 season) 09/01/2024    Influenza Vaccine (1) 10/01/2024    Pap Smear  03/15/2026     Patient/Caregiver Education:  Patient/Caregiver Education: There are no barriers to learning. Medical education done.  Outcome: Patient verbalizes understanding.    Educated by: MD     The patient indicates understanding of these issues and agrees to the plan.    SUGGESTED VACCINATIONS - Influenza, Pneumococcal, Zoster, Tetanus     Immunization History   Administered Date(s) Administered    >=3 YRS TRI  MULTIDOSE VIAL (01306) FLU CLINIC 10/15/2014    Covid-19 Vaccine Moderna 100 mcg/0.5 ml 05/16/2021, 06/13/2021    FLUZONE 6 months and older PFS 0.5 ml (45821) 10/17/2018, 11/16/2021    Flucelvax Influenza vaccine, trivalent (ccIIV3), 0.5mL IM 11/29/2023    MMR 09/19/2024    Meningococcal-Menactra 01/22/2020    TDAP 09/19/2024    Tb Intradermal Test 02/02/2017, 03/09/2021    Zoster Vaccine Recombinant Adjuvanted (Shingrix) 05/17/2023       Influenza Annually   Pneumococcal if high risk   Td/Tdap once then every 10 years   HPV Females 11-26: 3 doses   Zoster (Shingles) 60 and older: one dose   Varicella 2 doses if not immune   MMR 1-2 doses if born after 1955 and not immune     Patient Active Problem List   Diagnosis    Rheumatoid arthritis involving multiple sites (HCC)     PREVENTIVE VISIT,NEW,40-64

## 2024-09-19 NOTE — ADDENDUM NOTE
Addended by: KAYLIE CAMPOS on: 9/19/2024 11:33 AM     Modules accepted: Orders, Level of Service

## 2024-09-19 NOTE — TELEPHONE ENCOUNTER
Spoke with pt. regarding fax received from [employer] attempting to speak pt. for delivery of Enbrel.     She states she has a delivery scheduled for next week.     Pt. Inquiring if labs needed prior to next appointment. Instructed that CBC and CMP ordered per Dr. Salinas.

## 2024-09-20 ENCOUNTER — TELEPHONE (OUTPATIENT)
Dept: FAMILY MEDICINE CLINIC | Facility: CLINIC | Age: 54
End: 2024-09-20

## 2024-09-20 NOTE — TELEPHONE ENCOUNTER
Immunizations faxed to [facility] attn: Nicolasa Latosha @ [phone]. Fax confirmation received.

## 2024-09-20 NOTE — TELEPHONE ENCOUNTER
Please fax Vaccines to employer completed on 09/19/2024  [phone].  MMR and Tdap     [phone]      [facility] Attn: Nicolasa Reina

## 2024-10-03 ENCOUNTER — OFFICE VISIT (OUTPATIENT)
Dept: RHEUMATOLOGY | Facility: CLINIC | Age: 54
End: 2024-10-03
Payer: COMMERCIAL

## 2024-10-03 VITALS
WEIGHT: 166 LBS | RESPIRATION RATE: 16 BRPM | TEMPERATURE: 97 F | BODY MASS INDEX: 27.66 KG/M2 | DIASTOLIC BLOOD PRESSURE: 70 MMHG | HEIGHT: 65 IN | OXYGEN SATURATION: 97 % | HEART RATE: 82 BPM | SYSTOLIC BLOOD PRESSURE: 100 MMHG

## 2024-10-03 DIAGNOSIS — Z51.81 THERAPEUTIC DRUG MONITORING: ICD-10-CM

## 2024-10-03 DIAGNOSIS — M06.9 RHEUMATOID ARTHRITIS INVOLVING MULTIPLE SITES, UNSPECIFIED WHETHER RHEUMATOID FACTOR PRESENT (HCC): Primary | ICD-10-CM

## 2024-10-03 DIAGNOSIS — Z79.899 IMMUNODEFICIENCY DUE TO DRUG THERAPY (HCC): ICD-10-CM

## 2024-10-03 DIAGNOSIS — D84.821 IMMUNODEFICIENCY DUE TO DRUG THERAPY (HCC): ICD-10-CM

## 2024-10-03 DIAGNOSIS — M17.12 PRIMARY OSTEOARTHRITIS OF LEFT KNEE: ICD-10-CM

## 2024-10-03 DIAGNOSIS — M17.11 OSTEOARTHRITIS OF RIGHT KNEE, UNSPECIFIED OSTEOARTHRITIS TYPE: ICD-10-CM

## 2024-10-03 DIAGNOSIS — K21.9 GASTROESOPHAGEAL REFLUX DISEASE, UNSPECIFIED WHETHER ESOPHAGITIS PRESENT: ICD-10-CM

## 2024-10-03 PROCEDURE — 99214 OFFICE O/P EST MOD 30 MIN: CPT | Performed by: INTERNAL MEDICINE

## 2024-10-03 RX ORDER — NICOTINE POLACRILEX 4 MG/1
20 GUM, CHEWING ORAL NIGHTLY
Qty: 90 TABLET | Refills: 1 | Status: SHIPPED | OUTPATIENT
Start: 2024-10-03 | End: 2024-11-02

## 2024-10-03 NOTE — PROGRESS NOTES
Rheumatology f/u Patient Note  =====================================================================================================    Chief complaint: seropositive RA  Chief Complaint   Patient presents with    Rheumatoid Arthritis     3 month f/u. Feeling well. Wondering how long she will need to stay on medication. Some left knee pain. No stiffness in the morning.      PCP  Fan Wiley MD  Phone: [phone]    =====================================================================================================  HPI Date of visit: 11/9/2022    Amarcenio Ocampo is a 53 year old female     Patient here for evaluation of a positive rheumatoid factor and suspected rheumatoid arthritis.  Patient is here with  who is helping to translate from Bulgarian.  Patient has a history of chronic polyarthralgia for the last 1 to 2 years.  All joints are affected.  She has received annual cortisone injections to the left knee which has helped.  She has received 2 injections of the knee in the last several months.  -She was found to have a positive rheumatoid factor in summer 2022 and was diagnosed by her PCP with rheumatoid arthritis.  She has been on hydroxychloroquine and a low-dose prednisone for the time being which has been somewhat helpful.  However, her pain remains quite severe.  -Works at [employer].  Work is difficult.  Hard to grasp objects  -Denies any shortness of breath or any other extra-articular symptoms.  Denies any rash.  -4 children.  No miscarriages  -Denies any Raynaud's, dry eyes/mouth  Medications:  hydroxychloroquine (plaquenil) 200 mg daily for a few months  Prednisone 2.5 mg daily   Ibuprofen 400 mg BID prn  ==============================================================================================================  Visit: 10/05/23  She is 70% of her baseline.  More pain in the neck starting yesterday. On the right  Patient self-discontinued leflunomide 10 mg daily since last visit  Thinking  about Enbrel.  Family members on Enbrel for unclear indication  -Left knee pain persists.  -Bilateral foot pain persists; but better  -Tapering prednisone and doing well with this  Medications/therapies:  methotrexate 25 mg weekly on Hao  Folic acid  hydroxychloroquine (plaquenil) 200 mg twice daily   Prednisone 1 mg daily  ==============================================================================================================  Visit: 02/01/24  Bilateral knee pain is noted. Particularly of the left knee.  Otherwise joints are good.  Enbrel was approved, however patient did not call to schedule shipping.  -Tapered off prednisone since last visit  Medications/therapies:  methotrexate 25 mg weekly on Hao  Folic acid  hydroxychloroquine (plaquenil) 200 mg twice daily   ==============================================================================================================  Visit: 06/06/24  Pain is good outside of the left knee.  No significant improvement noted after starting Enbrel.  Medications/therapies:  methotrexate 25 mg weekly on Hao  Folic acid  hydroxychloroquine (plaquenil) 200 mg twice daily   Enbrel weekly  ==============================================================================================================  Today's Visit: 10/03/24    Doing very well today.  Left knee still bothering her.  Wants to come down on medications.    Medications/therapies:  methotrexate 25 mg weekly on Sunday  Folic acid  hydroxychloroquine (plaquenil) 200 mg twice daily   Enbrel weekly  Meloxicam 7.5 mg daily    5 point ROS negative except noted above  I had reviewed past medical and family histories together with allergy and medication lists documented.      Medications:  Current Outpatient Medications on File Prior to Visit   Medication Sig Dispense Refill    cyanocobalamin 1000 MCG Oral Tab Take 1 tablet (1,000 mcg total) by mouth daily. 90 tablet 3    ALPRAZOLAM 0.25 MG Oral Tab TAKE 1 TABLET(0.25  MG) BY MOUTH EVERY NIGHT AS NEEDED 15 tablet 0    Etanercept (ENBREL SURECLICK) 50 MG/ML Subcutaneous Solution Auto-injector Inject 50 mg into the skin once a week. 4 mL 0    FOLIC ACID 1 MG Oral Tab TAKE 1 TABLET BY MOUTH ONCE DAILY 90 tablet 3    FEROSUL 325 (65 Fe) MG Oral Tab TAKE 1 TABLET BY MOUTH EVERY DAY WITH BREAKFAST 90 tablet 0    methotrexate 2.5 MG Oral Tab Take 10 tablets by mouth once a week 130 tablet 0    hydroxychloroquine 200 MG Oral Tab Take 1 tablet (200 mg total) by mouth 2 (two) times daily. 180 tablet 1    cholecalciferol 50 MCG (2000 UT) Oral Cap Take 1 capsule (2,000 Units total) by mouth daily.      Acetaminophen (TYLENOL OR)        No current facility-administered medications on file prior to visit.         Allergies:  No Known Allergies      Objective    Vitals:    10/03/24 1526   BP: 100/70   Pulse: 82   Resp: 16   Temp: 97.2 °F (36.2 °C)   SpO2: 97%   Weight: 166 lb (75.3 kg)   Height: 5' 5\" (1.651 m)     GEN: NAD, well-nourished.   HEENT: Head: NCAT. Face: No lesions. Eyes: Conjunctiva clear. Sclera are anicteric. PERRLA. EOMs are full.   PULM:  CTAB, easy effort  Extremities: No cyanosis, edema or deformities.   Neurologic: Strength, CN2-12 grossly intact   Psych: normal affect.   Skin: No lesions or rashes.  MSK: 28 joint count performed. No evidence of synovitis in mcp, pip, dip, wrist, elbows, shoulders, hips, knees, ankles, mtp unless otherwise noted. Full ROM of elbows, wrists, knees.     PtGA: 1.5  SJ: 0  TJ: 1 (L knee)  MDGA: 0    Left greater than right knee OA changes  -Bilateral MCP hypertrophy with ulnar deviation swan-neck deformities    Labs:  Additional Labs:      Lab Results   Component Value Date    WBC 4.8 09/19/2024    RBC 3.84 09/19/2024    HGB 12.2 09/19/2024    HCT 36.6 09/19/2024    .0 09/19/2024    MCV 95.3 09/19/2024    MCH 31.8 09/19/2024    MCHC 33.3 09/19/2024    RDW 13.2 09/19/2024    NEPRELIM 1.84 09/19/2024    NEPERCENT 38.8 09/19/2024     LYPERCENT 46.6 09/19/2024    MOPERCENT 9.0 09/19/2024    EOPERCENT 4.6 09/19/2024    BAPERCENT 0.8 09/19/2024    NE 1.84 09/19/2024    LYMABS 2.22 09/19/2024    MOABSO 0.43 09/19/2024    EOABSO 0.22 09/19/2024    BAABSO 0.04 09/19/2024     Lab Results   Component Value Date    GLU 89 09/19/2024    BUN 11 09/19/2024    CREATSERUM 0.69 09/19/2024    ANIONGAP 5 09/19/2024    CA 9.8 09/19/2024    OSMOCALC 291 09/19/2024    ALKPHO 95 09/19/2024    AST 25 09/19/2024    ALT 18 09/19/2024    BILT 0.5 09/19/2024    TP 7.2 09/19/2024    ALB 4.4 09/19/2024    GLOBULIN 2.8 09/19/2024     09/19/2024    K 4.3 09/19/2024     09/19/2024    CO2 29.0 09/19/2024 6/2023  Ferritin WNL  B12 WNL  Vitamin D 20.8  TSH WNL  CBC W differential WNL  Creatinine 0.72, rest of CMP WNL    3/2023  Ferritin 6.8  Percent sat 7  CBC W differential WNL besides a hemoglobin 11.3  Creatinine 0.72, rest of CMP WNL    1/2023  CBC with differential is normal, creatinine 0.67, rest of CMP is normal    12/2022  STEVEN 1: 640 homogenous  Vitamin B12 206  Uric acid 2.9  IGRA is negative  Hep B/C is negative  UPCR: 37.1/262    CRP 2.93, sed rate 75  C3/4 is negative   lupus anticoagulant, anticardiolipin IgG/IgM, beta-2 glycoprotein IgG/IgM is negative    7/24/2022  STEVEN is positive (no titer)  Extractable nuclear antigen testing by microbead assay (9 and SHELLI's tested): All negative besides chromatin  .7  Ferritin 40.24  WBC 7.3, hemoglobin 11.6, platelets 453  Creatinine 0.6, rest of CMP is normal  HCV negative  Hepatitis B surface antigen, hepatitis B surface antibody is negative  HIV is negative  Lipids are normal  PTH, TSH normal  Vitamin D 27.5      8/8/2022 D[facility] from [facility]  Spine T score: -0.5  Left hip (T score): Femoral neck: -0.5, total hip: 0.1  Right hip (T score): Femoral neck 0.2, total hip: 0.4      Radiology:    Radiology review:       =====================================================================================================  Assessment and Plan    Assessment:  1. Rheumatoid arthritis involving multiple sites, unspecified whether rheumatoid factor present (HCC)    2. Primary osteoarthritis of left knee    3. Osteoarthritis of right knee, unspecified osteoarthritis type    4. Gastroesophageal reflux disease, unspecified whether esophagitis present    5. 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 Therapeutic drug monitoring        #seropositive (+RF, +CCP ) rheumatoid arthritis.  Symptoms have been present since 2021, however irreversible articular damage findings potentially indicate chronicity beyond that period of time. Initial CDAI in 11/2022 was 61.   -Prior medications: leflunomide 10 mg daily for about a month as previously recommended.  She had no side effects but self discontinued the medication due to concerns about polypharmacy.  -Today, CDAI is 2.5 indicating remission, I suspect knee osteoarthritis is contributing to the patient's residual disease activity score.    #left knee osteoarthritis: Minimal benefit to corticosteroid injections in the past of which she has received four of.    #Positive STEVEN, positive chromatin: Chromatin can be seen in SLE, however is not part of SLE classification criteria.  No extra-articular features to suggest SLE or another autoimmune disease    High risk medication labs including CMP and CBC w/ diff reviewed from 9/2024. Results are stable.   7/2024 ophtho exam (Dr. Qiu, [facility]) wnl    Plan:  Repeat blood work in 3 months.     -decrease methotrexate by two pills 10- > 8 weekly  -continue folic acid daily  -decrease hydroxychloroquine (plaquenil) 200 mg twice daily to one pill daily.   -continue enbrel weekly    Purchase Voltaren (diclofenac) 1% gel over-the-counter. It is in an orange packaging.     Discussed physical therapy for left knee osteoarthritis.  Placed  referral    GERD: every day PPI    Vaccinations needed given your RA and methotrexate therapy.   Get flu shot, covid  Shingles vaccine: 1 dose, then another dose 3 months later        Diagnoses and all orders for this visit:    Rheumatoid arthritis involving multiple sites, unspecified whether rheumatoid factor present (HCC)  -     Comp Metabolic Panel (14); Future  -     CBC With Differential With Platelet; Future    Primary osteoarthritis of left knee  -     Physical Therapy Referral - External  -     Omeprazole 20 MG Oral Tab EC; Take 20 mg by mouth at bedtime.  -     Comp Metabolic Panel (14); Future  -     CBC With Differential With Platelet; Future    Osteoarthritis of right knee, unspecified osteoarthritis type  -     Physical Therapy Referral - External  -     Omeprazole 20 MG Oral Tab EC; Take 20 mg by mouth at bedtime.  -     Comp Metabolic Panel (14); Future  -     CBC With Differential With Platelet; Future    Gastroesophageal reflux disease, unspecified whether esophagitis present  -     Omeprazole 20 MG Oral Tab EC; Take 20 mg by mouth at bedtime.  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      The above plan of care, diagnosis, orders, and follow-up were discussed with the patient. Questions related to this recommended plan of care were answered.    Thank you for referring this delightful patient to me. Please feel free to contact me with any questions.     This report was performed utilizing speech recognition software technology. Despite proofreading, speech recognition errors could escape detection. If a word or phrase is confusing or out of context, please do not hesitate to call for   clarification.       Kind regards      Virginia Salinas MD  [facility]

## 2024-10-03 NOTE — PATIENT INSTRUCTIONS
Repeat blood work in 3 months.     -decrease methotrexate by two pills 10- > 8 weekly  -continue folic acid daily  -decrease hydroxychloroquine (plaquenil) 200 mg twice daily to one pill daily.   -continue enbrel weekly    Purchase Voltaren (diclofenac) 1% gel over-the-counter. It is in an orange packaging.     Discussed physical therapy for left knee osteoarthritis.  Placed referral    GERD: every day PPI    Vaccinations needed given your RA and methotrexate therapy.   Get flu shot, covid  Shingles vaccine: 1 dose, then another dose 3 months later

## 2024-10-04 DIAGNOSIS — M05.79 RHEUMATOID ARTHRITIS INVOLVING MULTIPLE SITES WITH POSITIVE RHEUMATOID FACTOR (HCC): ICD-10-CM

## 2024-10-04 DIAGNOSIS — D64.9 ANEMIA, UNSPECIFIED TYPE: ICD-10-CM

## 2024-10-04 DIAGNOSIS — R79.89 LOW VITAMIN B12 LEVEL: ICD-10-CM

## 2024-10-04 DIAGNOSIS — R79.0 LOW FERRITIN: ICD-10-CM

## 2024-10-07 RX ORDER — METHOTREXATE 2.5 MG/1
TABLET ORAL
Qty: 130 TABLET | Refills: 0 | Status: SHIPPED | OUTPATIENT
Start: 2024-10-07

## 2024-10-07 RX ORDER — MELATONIN
1000 DAILY
Qty: 90 TABLET | Refills: 3 | OUTPATIENT
Start: 2024-10-07

## 2024-10-07 NOTE — TELEPHONE ENCOUNTER
Last office visit: 10/3/2024    Next Rheum Apt:2/6/2025 Virginia Salinas MD    Last fill: 7/8/2024 130 tab, 0 refills    Labs:   Lab Results   Component Value Date    CREATSERUM 0.69 09/19/2024    ALKPHO 95 09/19/2024    AST 25 09/19/2024    ALT 18 09/19/2024    BILT 0.5 09/19/2024    TP 7.2 09/19/2024    ALB 4.4 09/19/2024       Lab Results   Component Value Date    WBC 4.8 09/19/2024    HGB 12.2 09/19/2024    .0 09/19/2024    NEPRELIM 1.84 09/19/2024    NEPERCENT 38.8 09/19/2024    LYPERCENT 46.6 09/19/2024    NE 1.84 09/19/2024    LYMABS 2.22 09/19/2024

## 2024-10-14 DIAGNOSIS — Z51.81 THERAPEUTIC DRUG MONITORING: ICD-10-CM

## 2024-10-14 DIAGNOSIS — M05.79 RHEUMATOID ARTHRITIS INVOLVING MULTIPLE SITES WITH POSITIVE RHEUMATOID FACTOR (HCC): Primary | ICD-10-CM

## 2024-10-14 DIAGNOSIS — R79.89 LOW VITAMIN B12 LEVEL: ICD-10-CM

## 2024-10-14 DIAGNOSIS — D64.9 ANEMIA, UNSPECIFIED TYPE: ICD-10-CM

## 2024-10-14 DIAGNOSIS — R79.0 LOW FERRITIN: ICD-10-CM

## 2024-10-14 RX ORDER — HYDROXYCHLOROQUINE SULFATE 200 MG/1
200 TABLET, FILM COATED ORAL 2 TIMES DAILY
Qty: 180 TABLET | Refills: 1 | Status: SHIPPED | OUTPATIENT
Start: 2024-10-14

## 2024-10-14 NOTE — TELEPHONE ENCOUNTER
Last office visit: 10/3/2024    Next Rheum Apt:2/6/2025 Virginia Salinas MD    Last fill: 9/5/2024  60 tabs     Labs:   Lab Results   Component Value Date    CREATSERUM 0.69 09/19/2024    ALKPHO 95 09/19/2024    AST 25 09/19/2024    ALT 18 09/19/2024    BILT 0.5 09/19/2024    TP 7.2 09/19/2024    ALB 4.4 09/19/2024       Lab Results   Component Value Date    WBC 4.8 09/19/2024    HGB 12.2 09/19/2024    .0 09/19/2024    NEPRELIM 1.84 09/19/2024    NEPERCENT 38.8 09/19/2024    LYPERCENT 46.6 09/19/2024    NE 1.84 09/19/2024    LYMABS 2.22 09/19/2024

## 2024-10-25 ENCOUNTER — TELEPHONE (OUTPATIENT)
Dept: RHEUMATOLOGY | Facility: CLINIC | Age: 54
End: 2024-10-25

## 2024-10-29 DIAGNOSIS — R79.0 LOW FERRITIN: ICD-10-CM

## 2024-10-29 RX ORDER — LIDOCAINE HYDROCHLORIDE 20 MG/ML
1 SOLUTION ORAL; TOPICAL
Qty: 90 TABLET | Refills: 0 | Status: SHIPPED | OUTPATIENT
Start: 2024-10-29

## 2024-10-29 NOTE — TELEPHONE ENCOUNTER
LOV: 10/3/2024    RTC: 3 months   Future Appointments   Date Time Provider Department Center   11/6/2024 11:40 AM [facility]   2/6/2025 11:30 AM Virginia Salinas MD [id number] [address]   LABS:   Component      Latest Ref Rng 9/19/2024   WBC      4.0 - 11.0 x10(3) uL 4.8    RBC      3.80 - 5.30 x10(6)uL 3.84    Hemoglobin      12.0 - 16.0 g/dL 12.2    Hematocrit      35.0 - 48.0 % 36.6    Platelet Count      150.0 - 450.0 10(3)uL 232.0    MCV      80.0 - 100.0 fL 95.3    MCH      26.0 - 34.0 pg 31.8    MCHC      31.0 - 37.0 g/dL 33.3    RDW      % 13.2    Prelim Neutrophil Abs      1.50 - 7.70 x10 (3) uL 1.84    Neutrophils Absolute      1.50 - 7.70 x10(3) uL 1.84    Lymphocytes Absolute      1.00 - 4.00 x10(3) uL 2.22    Monocytes Absolute      0.10 - 1.00 x10(3) uL 0.43    Eosinophils Absolute      0.00 - 0.70 x10(3) uL 0.22    Basophils Absolute      0.00 - 0.20 x10(3) uL 0.04    Immature Granulocyte Absolute      0.00 - 1.00 x10(3) uL 0.01    Neutrophils %      % 38.8    Lymphocytes %      % 46.6    Monocytes %      % 9.0    Eosinophils %      % 4.6    Basophils %      % 0.8    Immature Granulocyte %      % 0.2    Glucose      70 - 99 mg/dL 89    Sodium      136 - 145 mmol/L 141    Potassium      3.5 - 5.1 mmol/L 4.3    Chloride      98 - 112 mmol/L 107    Carbon Dioxide, Total      21.0 - 32.0 mmol/L 29.0    ANION GAP      0 - 18 mmol/L 5    BUN      9 - 23 mg/dL 11    CREATININE      0.55 - 1.02 mg/dL 0.69    CALCIUM      8.7 - 10.4 mg/dL 9.8    CALCULATED OSMOLALITY      275 - 295 mOsm/kg 291    EGFR      >=60 mL/min/1.73m2 104    AST (SGOT)      <34 U/L 25    ALT (SGPT)      10 - 49 U/L 18    ALKALINE PHOSPHATASE      41 - 108 U/L 95    Total Bilirubin      0.3 - 1.2 mg/dL 0.5    PROTEIN, TOTAL      5.7 - 8.2 g/dL 7.2    Albumin      3.2 - 4.8 g/dL 4.4    Globulin      2.0 - 3.5 g/dL 2.8    A/G Ratio      1.0 - 2.0  1.6    Patient Fasting for CMP? No    Cholesterol, Total      <200  mg/dL 149    HDL Cholesterol      40 - 59 mg/dL 43    Triglycerides      30 - 149 mg/dL 75    LDL Cholesterol Calc      <100 mg/dL 91    VLDL      0 - 30 mg/dL 12    NON-HDL CHOLESTEROL      <130 mg/dL 106    Patient Fasting for Lipid? No      LAST REFILL: 8/1/2024, Quantity 90 tablets, Refills 0    Dr Salinas-- orders pending, approve if agreeable

## 2024-11-06 ENCOUNTER — HOSPITAL ENCOUNTER (OUTPATIENT)
Dept: MAMMOGRAPHY | Age: 54
Discharge: HOME | End: 2024-11-06
Attending: FAMILY MEDICINE
Payer: COMMERCIAL

## 2024-11-06 DIAGNOSIS — Z12.31 SCREENING MAMMOGRAM FOR BREAST CANCER: ICD-10-CM

## 2024-11-06 PROCEDURE — 77063 BREAST TOMOSYNTHESIS BI: CPT | Performed by: FAMILY MEDICINE

## 2024-11-06 PROCEDURE — 77067 SCR MAMMO BI INCL CAD: CPT | Performed by: FAMILY MEDICINE

## 2024-11-11 DIAGNOSIS — F41.9 ANXIETY: ICD-10-CM

## 2024-11-12 RX ORDER — ALPRAZOLAM 0.25 MG/1
0.25 TABLET ORAL
Qty: 15 TABLET | Refills: 0 | Status: SHIPPED | OUTPATIENT
Start: 2024-11-12

## 2024-11-12 NOTE — TELEPHONE ENCOUNTER
Future Appointments   Date Time Provider Department Center   2/6/2025 11:20 AM Virginia Salinas MD [id number] [address]     Last office visit: 10/3/2024    Last fill: 8/12/2024 15 tab, 0 refills

## 2024-11-18 DIAGNOSIS — M05.79 RHEUMATOID ARTHRITIS INVOLVING MULTIPLE SITES WITH POSITIVE RHEUMATOID FACTOR (HCC): Primary | ICD-10-CM

## 2024-11-18 DIAGNOSIS — R79.89 LOW VITAMIN B12 LEVEL: ICD-10-CM

## 2024-11-18 DIAGNOSIS — R79.0 LOW FERRITIN: ICD-10-CM

## 2024-11-18 DIAGNOSIS — D64.9 ANEMIA, UNSPECIFIED TYPE: ICD-10-CM

## 2024-11-19 RX ORDER — METHOTREXATE 2.5 MG/1
TABLET ORAL
Qty: 130 TABLET | Refills: 0 | Status: SHIPPED | OUTPATIENT
Start: 2024-11-19

## 2024-11-19 NOTE — TELEPHONE ENCOUNTER
Last office visit: 10/3/24    Next Rheum Apt:2/6/2025 Virginia Salinas MD    Last fill: 10/7/24    Labs:   Lab Results   Component Value Date    CREATSERUM 0.69 09/19/2024    ALKPHO 95 09/19/2024    AST 25 09/19/2024    ALT 18 09/19/2024    BILT 0.5 09/19/2024    TP 7.2 09/19/2024    ALB 4.4 09/19/2024       Lab Results   Component Value Date    WBC 4.8 09/19/2024    HGB 12.2 09/19/2024    .0 09/19/2024    NEPRELIM 1.84 09/19/2024    NEPERCENT 38.8 09/19/2024    LYPERCENT 46.6 09/19/2024    NE 1.84 09/19/2024    LYMABS 2.22 09/19/2024

## 2024-11-20 ENCOUNTER — TELEMEDICINE (OUTPATIENT)
Dept: FAMILY MEDICINE CLINIC | Facility: CLINIC | Age: 54
End: 2024-11-20
Payer: COMMERCIAL

## 2024-11-20 DIAGNOSIS — R05.1 ACUTE COUGH: Primary | ICD-10-CM

## 2024-11-20 DIAGNOSIS — E61.1 IRON DEFICIENCY: ICD-10-CM

## 2024-11-20 PROCEDURE — 99214 OFFICE O/P EST MOD 30 MIN: CPT | Performed by: FAMILY MEDICINE

## 2024-11-20 RX ORDER — BENZONATATE 200 MG/1
200 CAPSULE ORAL 3 TIMES DAILY PRN
Qty: 30 CAPSULE | Refills: 0 | Status: SHIPPED | OUTPATIENT
Start: 2024-11-20

## 2024-11-20 NOTE — PROGRESS NOTES
Subjective:   Patient ID: Paz Ocampo is a 54 year old female.    HPI  Ms. Ocampo is a very pleasant 54-year-old female with history of rheumatoid arthritis, iron deficiency presenting for video visit today for cough for the past few days.  Cough is dry and nonproductive.  No fever no congestion no chest pain no shortness of breath no nausea no vomiting no abdominal pain.  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    I had reviewed past medical and family histories together with allergy and medication lists documented.    History/Other:   Review of Systems   Constitutional:  Negative for fatigue and fever.   HENT:  Negative for congestion, sore throat and trouble swallowing.    Respiratory:  Negative for shortness of breath.    Cardiovascular:  Negative for chest pain.   Gastrointestinal:  Negative for abdominal pain, diarrhea, nausea and vomiting.     Current Outpatient Medications   Medication Sig Dispense Refill    benzonatate 200 MG Oral Cap Take 1 capsule (200 mg total) by mouth 3 (three) times daily as needed. 30 capsule 0    methotrexate 2.5 MG Oral Tab Take 10 tablets by mouth once a week 130 tablet 0    ALPRAZOLAM 0.25 MG Oral Tab TAKE 1 TABLET(0.25 MG) BY MOUTH EVERY NIGHT AS NEEDED 15 tablet 0    FEROSUL 325 (65 Fe) MG Oral Tab TAKE 1 TABLET BY MOUTH EVERY DAY WITH BREAKFAST 90 tablet 0    HYDROXYCHLOROQUINE 200 MG Oral Tab TAKE 1 TABLET BY MOUTH TWICE DAILY 180 tablet 1    cyanocobalamin 1000 MCG Oral Tab Take 1 tablet (1,000 mcg total) by mouth daily. 90 tablet 3    Etanercept (ENBREL SURECLICK) 50 MG/ML Subcutaneous Solution Auto-injector Inject 50 mg into the skin once a week. 4 mL 0    FOLIC ACID 1 MG Oral Tab TAKE 1 TABLET BY MOUTH ONCE DAILY 90 tablet 3    cholecalciferol 50 MCG (2000 UT) Oral Cap Take 1 capsule (2,000 Units total) by mouth daily.      Acetaminophen (TYLENOL OR)        Allergies:Allergies[1]    Objective:   Physical Exam  Constitutional:       General: She  is not in acute distress.  Neurological:      Mental Status: She is alert.         Assessment & Plan:   1. Acute cough   -Viral versus allergic  - She is getting better otherwise  - Trial of Tessalon Perles as needed for cough suppression  - Keep hydrated  - Go to the emergency room with respiratory distress  - Call or come in sooner if symptoms worsen or persist   2. Iron deficiency   -We will recheck iron studies  - Currently on iron supplements       This note was prepared using Dragon [employer] voice recognition dictation software. As a result errors may occur. When identified these errors have been corrected. While every attempt is made to correct errors during dictation discrepancies may still exist            Orders Placed This Encounter   Procedures    Iron And Tibc [E]    Ferritin [E]       Meds This Visit:  Requested Prescriptions     Signed Prescriptions Disp Refills    benzonatate 200 MG Oral Cap 30 capsule 0     Sig: Take 1 capsule (200 mg total) by mouth 3 (three) times daily as needed.       Imaging & Referrals:  None         [1] No Known Allergies

## 2024-12-19 ENCOUNTER — TELEPHONE (OUTPATIENT)
Dept: GASTROENTEROLOGY | Age: 54
End: 2024-12-19

## 2024-12-19 DIAGNOSIS — Z12.11 SPECIAL SCREENING FOR MALIGNANT NEOPLASMS, COLON: Primary | ICD-10-CM

## 2024-12-21 DIAGNOSIS — R79.89 LOW VITAMIN B12 LEVEL: ICD-10-CM

## 2024-12-21 DIAGNOSIS — D64.9 ANEMIA, UNSPECIFIED TYPE: ICD-10-CM

## 2024-12-21 DIAGNOSIS — M05.79 RHEUMATOID ARTHRITIS INVOLVING MULTIPLE SITES WITH POSITIVE RHEUMATOID FACTOR (HCC): ICD-10-CM

## 2024-12-21 DIAGNOSIS — R79.0 LOW FERRITIN: ICD-10-CM

## 2024-12-22 RX ORDER — BENZONATATE 200 MG/1
200 CAPSULE ORAL 3 TIMES DAILY PRN
Qty: 30 CAPSULE | Refills: 0 | Status: SHIPPED | OUTPATIENT
Start: 2024-12-22

## 2024-12-23 DIAGNOSIS — M05.79 RHEUMATOID ARTHRITIS INVOLVING MULTIPLE SITES WITH POSITIVE RHEUMATOID FACTOR (HCC): Primary | ICD-10-CM

## 2024-12-23 DIAGNOSIS — R79.0 LOW FERRITIN: ICD-10-CM

## 2024-12-23 DIAGNOSIS — R79.89 LOW VITAMIN B12 LEVEL: ICD-10-CM

## 2024-12-23 DIAGNOSIS — D64.9 ANEMIA, UNSPECIFIED TYPE: ICD-10-CM

## 2024-12-23 NOTE — TELEPHONE ENCOUNTER
Last office visit: 10/3/2024    Next Rheum Apt:2/6/2025 Virginia Salinas MD    Last fill: 11/19/2024 130 tab, 0 refills    Sending pt my chart message that there are fills at the pharmacy    Labs:   Lab Results   Component Value Date    CREATSERUM 0.69 09/19/2024    ALKPHO 95 09/19/2024    AST 25 09/19/2024    ALT 18 09/19/2024    BILT 0.5 09/19/2024    TP 7.2 09/19/2024    ALB 4.4 09/19/2024       Lab Results   Component Value Date    WBC 4.8 09/19/2024    HGB 12.2 09/19/2024    .0 09/19/2024    NEPRELIM 1.84 09/19/2024    NEPERCENT 38.8 09/19/2024    LYPERCENT 46.6 09/19/2024    NE 1.84 09/19/2024    LYMABS 2.22 09/19/2024

## 2024-12-24 NOTE — TELEPHONE ENCOUNTER
Last office visit: 10/3/24    Next Rheum Apt:2/6/2025 Virginia Salinas MD    Last fill: 11/19/24    Labs:   Lab Results   Component Value Date    CREATSERUM 0.69 09/19/2024    ALKPHO 95 09/19/2024    AST 25 09/19/2024    ALT 18 09/19/2024    BILT 0.5 09/19/2024    TP 7.2 09/19/2024    ALB 4.4 09/19/2024       Lab Results   Component Value Date    WBC 4.8 09/19/2024    HGB 12.2 09/19/2024    .0 09/19/2024    NEPRELIM 1.84 09/19/2024    NEPERCENT 38.8 09/19/2024    LYPERCENT 46.6 09/19/2024    NE 1.84 09/19/2024    LYMABS 2.22 09/19/2024

## 2024-12-24 NOTE — TELEPHONE ENCOUNTER
MCM sent as a reminder to complete all outstanding labs prior to next fill. Awaiting possible response.

## 2024-12-26 ENCOUNTER — LAB ENCOUNTER (OUTPATIENT)
Dept: LAB | Age: 54
End: 2024-12-26
Attending: FAMILY MEDICINE
Payer: COMMERCIAL

## 2024-12-26 DIAGNOSIS — M17.12 PRIMARY OSTEOARTHRITIS OF LEFT KNEE: ICD-10-CM

## 2024-12-26 DIAGNOSIS — K21.9 GASTROESOPHAGEAL REFLUX DISEASE, UNSPECIFIED WHETHER ESOPHAGITIS PRESENT: ICD-10-CM

## 2024-12-26 DIAGNOSIS — M06.9 RHEUMATOID ARTHRITIS INVOLVING MULTIPLE SITES, UNSPECIFIED WHETHER RHEUMATOID FACTOR PRESENT (HCC): ICD-10-CM

## 2024-12-26 DIAGNOSIS — M17.11 OSTEOARTHRITIS OF RIGHT KNEE, UNSPECIFIED OSTEOARTHRITIS TYPE: ICD-10-CM

## 2024-12-26 DIAGNOSIS — R73.9 HYPERGLYCEMIA: ICD-10-CM

## 2024-12-26 DIAGNOSIS — E61.1 IRON DEFICIENCY: ICD-10-CM

## 2024-12-26 LAB
ALBUMIN SERPL-MCNC: 4.4 G/DL (ref 3.2–4.8)
ALBUMIN/GLOB SERPL: 1.5 {RATIO} (ref 1–2)
ALP LIVER SERPL-CCNC: 101 U/L
ALT SERPL-CCNC: 16 U/L
ANION GAP SERPL CALC-SCNC: 6 MMOL/L (ref 0–18)
AST SERPL-CCNC: 23 U/L (ref ?–34)
BASOPHILS # BLD AUTO: 0.03 X10(3) UL (ref 0–0.2)
BASOPHILS NFR BLD AUTO: 0.6 %
BILIRUB SERPL-MCNC: 0.5 MG/DL (ref 0.3–1.2)
BUN BLD-MCNC: 10 MG/DL (ref 9–23)
CALCIUM BLD-MCNC: 9.9 MG/DL (ref 8.7–10.4)
CHLORIDE SERPL-SCNC: 108 MMOL/L (ref 98–112)
CO2 SERPL-SCNC: 28 MMOL/L (ref 21–32)
CREAT BLD-MCNC: 0.66 MG/DL
DEPRECATED HBV CORE AB SER IA-ACNC: 59 NG/ML
EGFRCR SERPLBLD CKD-EPI 2021: 105 ML/MIN/1.73M2 (ref 60–?)
EOSINOPHIL # BLD AUTO: 0.09 X10(3) UL (ref 0–0.7)
EOSINOPHIL NFR BLD AUTO: 1.9 %
ERYTHROCYTE [DISTWIDTH] IN BLOOD BY AUTOMATED COUNT: 13.2 %
FASTING STATUS PATIENT QL REPORTED: YES
GLOBULIN PLAS-MCNC: 3 G/DL (ref 2–3.5)
GLUCOSE BLD-MCNC: 95 MG/DL (ref 70–99)
HCT VFR BLD AUTO: 38.4 %
HGB BLD-MCNC: 12.9 G/DL
IMM GRANULOCYTES # BLD AUTO: 0.01 X10(3) UL (ref 0–1)
IMM GRANULOCYTES NFR BLD: 0.2 %
IRON SATN MFR SERPL: 18 %
IRON SERPL-MCNC: 58 UG/DL
LYMPHOCYTES # BLD AUTO: 1.98 X10(3) UL (ref 1–4)
LYMPHOCYTES NFR BLD AUTO: 41.7 %
MCH RBC QN AUTO: 31.9 PG (ref 26–34)
MCHC RBC AUTO-ENTMCNC: 33.6 G/DL (ref 31–37)
MCV RBC AUTO: 95 FL
MONOCYTES # BLD AUTO: 0.41 X10(3) UL (ref 0.1–1)
MONOCYTES NFR BLD AUTO: 8.6 %
NEUTROPHILS # BLD AUTO: 2.23 X10 (3) UL (ref 1.5–7.7)
NEUTROPHILS # BLD AUTO: 2.23 X10(3) UL (ref 1.5–7.7)
NEUTROPHILS NFR BLD AUTO: 47 %
OSMOLALITY SERPL CALC.SUM OF ELEC: 293 MOSM/KG (ref 275–295)
PLATELET # BLD AUTO: 235 10(3)UL (ref 150–450)
POTASSIUM SERPL-SCNC: 4.1 MMOL/L (ref 3.5–5.1)
PROT SERPL-MCNC: 7.4 G/DL (ref 5.7–8.2)
RBC # BLD AUTO: 4.04 X10(6)UL
SODIUM SERPL-SCNC: 142 MMOL/L (ref 136–145)
TOTAL IRON BINDING CAPACITY: 330 UG/DL (ref 250–425)
TRANSFERRIN SERPL-MCNC: 262 MG/DL (ref 250–380)
WBC # BLD AUTO: 4.8 X10(3) UL (ref 4–11)

## 2024-12-26 PROCEDURE — 83540 ASSAY OF IRON: CPT

## 2024-12-26 PROCEDURE — 36415 COLL VENOUS BLD VENIPUNCTURE: CPT

## 2024-12-26 PROCEDURE — 82728 ASSAY OF FERRITIN: CPT

## 2024-12-26 PROCEDURE — 83550 IRON BINDING TEST: CPT

## 2024-12-26 PROCEDURE — 85025 COMPLETE CBC W/AUTO DIFF WBC: CPT

## 2024-12-26 PROCEDURE — 80053 COMPREHEN METABOLIC PANEL: CPT

## 2024-12-26 PROCEDURE — 83036 HEMOGLOBIN GLYCOSYLATED A1C: CPT

## 2024-12-27 ENCOUNTER — TELEPHONE (OUTPATIENT)
Dept: FAMILY MEDICINE CLINIC | Facility: CLINIC | Age: 54
End: 2024-12-27

## 2024-12-27 DIAGNOSIS — R73.03 PREDIABETES: Primary | ICD-10-CM

## 2024-12-27 DIAGNOSIS — R73.9 HYPERGLYCEMIA: ICD-10-CM

## 2024-12-27 LAB
EST. AVERAGE GLUCOSE BLD GHB EST-MCNC: 114 MG/DL (ref 68–126)
HBA1C MFR BLD: 5.6 % (ref ?–5.7)

## 2024-12-27 RX ORDER — BISACODYL 5 MG/1
TABLET, DELAYED RELEASE ORAL
Qty: 2 TABLET | Refills: 0 | Status: SHIPPED | OUTPATIENT
Start: 2024-12-27 | End: 2025-02-10

## 2024-12-27 RX ORDER — SIMETHICONE 125 MG
TABLET,CHEWABLE ORAL
Qty: 2 TABLET | Refills: 0 | Status: SHIPPED | OUTPATIENT
Start: 2024-12-27 | End: 2025-02-10

## 2024-12-30 ENCOUNTER — TELEMEDICINE (OUTPATIENT)
Dept: FAMILY MEDICINE CLINIC | Facility: CLINIC | Age: 54
End: 2024-12-30
Payer: COMMERCIAL

## 2024-12-30 DIAGNOSIS — R05.1 ACUTE COUGH: Primary | ICD-10-CM

## 2024-12-30 RX ORDER — METHOTREXATE 2.5 MG/1
TABLET ORAL
Qty: 130 TABLET | Refills: 0 | OUTPATIENT
Start: 2024-12-30

## 2024-12-30 RX ORDER — AMOXICILLIN 500 MG/1
500 CAPSULE ORAL 2 TIMES DAILY
Qty: 20 CAPSULE | Refills: 0 | Status: SHIPPED | OUTPATIENT
Start: 2024-12-30 | End: 2025-01-09

## 2024-12-30 RX ORDER — METHYLPREDNISOLONE 4 MG/1
TABLET ORAL
Qty: 1 EACH | Refills: 0 | Status: SHIPPED | OUTPATIENT
Start: 2024-12-30

## 2024-12-30 NOTE — PROGRESS NOTES
Subjective:   Patient ID: Paz Ocampo is a 54 year old female.    HPI  Ms. Ocampo is a pleasant 54-year-old female with history of rheumatoid arthritis presenting today for video visit for cough associate with congestion body aches sneezing which started yesterday.  He also has been having dry mouth and sore throat.  No sick contacts that she reports of.  She is requesting antibiotic and steroids.  No shortness of breath no chest pain no nausea no vomiting no abdominal pain no fever.    I had reviewed past medical and family histories together with allergy and medication lists documented.    History/Other:   Review of Systems   Constitutional:  Negative for fatigue and fever.   Respiratory:  Negative for shortness of breath.    Cardiovascular:  Negative for chest pain.   Gastrointestinal:  Negative for abdominal pain, diarrhea, nausea and vomiting.     Current Outpatient Medications   Medication Sig Dispense Refill    amoxicillin 500 MG Oral Cap Take 1 capsule (500 mg total) by mouth 2 (two) times daily for 10 days. 20 capsule 0    methylPREDNISolone (MEDROL) 4 MG Oral Tablet Therapy Pack As directed. 1 each 0    benzonatate 200 MG Oral Cap Take 1 capsule (200 mg total) by mouth 3 (three) times daily as needed. 30 capsule 0    methotrexate 2.5 MG Oral Tab Take 10 tablets by mouth once a week 130 tablet 0    ALPRAZOLAM 0.25 MG Oral Tab TAKE 1 TABLET(0.25 MG) BY MOUTH EVERY NIGHT AS NEEDED 15 tablet 0    FEROSUL 325 (65 Fe) MG Oral Tab TAKE 1 TABLET BY MOUTH EVERY DAY WITH BREAKFAST 90 tablet 0    HYDROXYCHLOROQUINE 200 MG Oral Tab TAKE 1 TABLET BY MOUTH TWICE DAILY 180 tablet 1    cyanocobalamin 1000 MCG Oral Tab Take 1 tablet (1,000 mcg total) by mouth daily. 90 tablet 3    Etanercept (ENBREL SURECLICK) 50 MG/ML Subcutaneous Solution Auto-injector Inject 50 mg into the skin once a week. 4 mL 0    FOLIC ACID 1 MG Oral Tab TAKE 1 TABLET BY MOUTH ONCE DAILY 90 tablet 3    cholecalciferol 50 MCG (2000 UT) Oral Cap  Take 1 capsule (2,000 Units total) by mouth daily.      Acetaminophen (TYLENOL OR)        Allergies:Allergies[1]    Objective:   Physical Exam  Constitutional:       General: She is not in acute distress.  Neurological:      Mental Status: She is alert.         Assessment & Plan:   1. Acute cough    -Viral versus bacterial  - Offered her to do test for COVID-19, influenza and RSV but declined  - Will send for amoxicillin and Medrol Dosepak  - Keep hydrated  - Go to the emergency room if with respiratory distress and/or severe dehydration  - May take Tylenol or ibuprofen as needed for fever.      This note was prepared using Dragon Medical voice recognition dictation software. As a result errors may occur. When identified these errors have been corrected. While every attempt is made to correct errors during dictation discrepancies may still exist            No orders of the defined types were placed in this encounter.      Meds This Visit:  Requested Prescriptions     Signed Prescriptions Disp Refills    amoxicillin 500 MG Oral Cap 20 capsule 0     Sig: Take 1 capsule (500 mg total) by mouth 2 (two) times daily for 10 days.    methylPREDNISolone (MEDROL) 4 MG Oral Tablet Therapy Pack 1 each 0     Sig: As directed.       Imaging & Referrals:  None         [1] No Known Allergies

## 2025-01-02 RX ORDER — METHOTREXATE 2.5 MG/1
TABLET ORAL
Qty: 130 TABLET | Refills: 0 | Status: SHIPPED | OUTPATIENT
Start: 2025-01-02

## 2025-01-02 NOTE — TELEPHONE ENCOUNTER
Lab work now completed    Next Rheum Apt:2/6/2025 Virginia Salinas MD      Labs:   Lab Results   Component Value Date    CREATSERUM 0.66 12/26/2024    ALKPHO 101 12/26/2024    AST 23 12/26/2024    ALT 16 12/26/2024    BILT 0.5 12/26/2024    TP 7.4 12/26/2024    ALB 4.4 12/26/2024       Lab Results   Component Value Date    WBC 4.8 12/26/2024    HGB 12.9 12/26/2024    .0 12/26/2024    NEPRELIM 2.23 12/26/2024    NEPERCENT 47.0 12/26/2024    LYPERCENT 41.7 12/26/2024    NE 2.23 12/26/2024    LYMABS 1.98 12/26/2024

## 2025-01-27 DIAGNOSIS — R79.0 LOW FERRITIN: Primary | ICD-10-CM

## 2025-01-27 RX ORDER — LIDOCAINE HYDROCHLORIDE 20 MG/ML
1 SOLUTION ORAL; TOPICAL
Qty: 90 TABLET | Refills: 0 | Status: SHIPPED | OUTPATIENT
Start: 2025-01-27

## 2025-01-27 NOTE — TELEPHONE ENCOUNTER
Last office visit: 10/3/24    Next Rheum Apt:2/6/2025 Virginia Salinas MD    Last fill: 10/29/24    Labs:   Lab Results   Component Value Date    CREATSERUM 0.66 12/26/2024    ALKPHO 101 12/26/2024    AST 23 12/26/2024    ALT 16 12/26/2024    BILT 0.5 12/26/2024    TP 7.4 12/26/2024    ALB 4.4 12/26/2024       Lab Results   Component Value Date    WBC 4.8 12/26/2024    HGB 12.9 12/26/2024    .0 12/26/2024    NEPRELIM 2.23 12/26/2024    NEPERCENT 47.0 12/26/2024    LYPERCENT 41.7 12/26/2024    NE 2.23 12/26/2024    LYMABS 1.98 12/26/2024

## 2025-01-29 RX ORDER — METHYLPREDNISOLONE 4 MG/1
TABLET ORAL
Qty: 21 EACH | Refills: 0 | Status: SHIPPED | OUTPATIENT
Start: 2025-01-29

## 2025-02-04 ENCOUNTER — TELEPHONE (OUTPATIENT)
Dept: GASTROENTEROLOGY | Age: 55
End: 2025-02-04

## 2025-02-05 ENCOUNTER — TELEPHONE (OUTPATIENT)
Dept: GASTROENTEROLOGY | Age: 55
End: 2025-02-05

## 2025-02-06 ENCOUNTER — OFFICE VISIT (OUTPATIENT)
Dept: RHEUMATOLOGY | Facility: CLINIC | Age: 55
End: 2025-02-06
Payer: COMMERCIAL

## 2025-02-06 VITALS
HEIGHT: 65 IN | WEIGHT: 164 LBS | DIASTOLIC BLOOD PRESSURE: 70 MMHG | OXYGEN SATURATION: 97 % | HEART RATE: 68 BPM | SYSTOLIC BLOOD PRESSURE: 110 MMHG | RESPIRATION RATE: 16 BRPM | TEMPERATURE: 98 F | BODY MASS INDEX: 27.32 KG/M2

## 2025-02-06 DIAGNOSIS — M05.79 RHEUMATOID ARTHRITIS INVOLVING MULTIPLE SITES WITH POSITIVE RHEUMATOID FACTOR (HCC): Primary | ICD-10-CM

## 2025-02-06 DIAGNOSIS — Z79.899 IMMUNODEFICIENCY DUE TO DRUG THERAPY (HCC): ICD-10-CM

## 2025-02-06 DIAGNOSIS — Z78.0 MENOPAUSE: ICD-10-CM

## 2025-02-06 DIAGNOSIS — R79.89 LOW VITAMIN B12 LEVEL: ICD-10-CM

## 2025-02-06 DIAGNOSIS — M54.50 LOW BACK PAIN, UNSPECIFIED BACK PAIN LATERALITY, UNSPECIFIED CHRONICITY, UNSPECIFIED WHETHER SCIATICA PRESENT: ICD-10-CM

## 2025-02-06 DIAGNOSIS — R79.0 LOW FERRITIN: ICD-10-CM

## 2025-02-06 DIAGNOSIS — Z51.81 THERAPEUTIC DRUG MONITORING: ICD-10-CM

## 2025-02-06 DIAGNOSIS — D84.821 IMMUNODEFICIENCY DUE TO DRUG THERAPY (HCC): ICD-10-CM

## 2025-02-06 DIAGNOSIS — D64.9 ANEMIA, UNSPECIFIED TYPE: ICD-10-CM

## 2025-02-06 PROCEDURE — 99214 OFFICE O/P EST MOD 30 MIN: CPT | Performed by: INTERNAL MEDICINE

## 2025-02-06 RX ORDER — METHOTREXATE 2.5 MG/1
2.5 TABLET ORAL WEEKLY
Qty: 104 TABLET | Refills: 0 | Status: SHIPPED | OUTPATIENT
Start: 2025-02-06

## 2025-02-06 NOTE — PATIENT INSTRUCTIONS
Decrease vitamin B12 and iron pill to once a week    Blood work in early April and early July  -for back pain Acetaminophen (Tylenol): Take two 650 mg extended release/arthritis strength capsules as needed.    -continue methotrexate 8 pills weekly  -continue folic acid daily  -stop hydroxychloroquine (plaquenil)   -continue enbrel every 2 weeks

## 2025-02-06 NOTE — PROGRESS NOTES
Rheumatology f/u Patient Note  =====================================================================================================    Chief complaint: seropositive RA  Chief Complaint   Patient presents with    Follow - Up    Rheumatoid Arthritis     Patient comes into the office today for a 4 month f/u apt with Dr for Rheumatoid arthritis. Patient states that she is feeling good but she does have a little bit of pain in her LFT knee and feet from time to time. Rapid 3 converted to a 2.3.     PCP  Fan Wiley MD  Phone: [phone]    =====================================================================================================  HPI Date of visit: 11/9/2022    Paz Ocampo is a 54 year old female     Patient here for evaluation of a positive rheumatoid factor and suspected rheumatoid arthritis.  Patient is here with  who is helping to translate from Armenian.  Patient has a history of chronic polyarthralgia for the last 1 to 2 years.  All joints are affected.  She has received annual cortisone injections to the left knee which has helped.  She has received 2 injections of the knee in the last several months.  -She was found to have a positive rheumatoid factor in summer 2022 and was diagnosed by her PCP with rheumatoid arthritis.  She has been on hydroxychloroquine and a low-dose prednisone for the time being which has been somewhat helpful.  However, her pain remains quite severe.  -Works at [employer]  Work is difficult.  Hard to grasp objects  -Denies any shortness of breath or any other extra-articular symptoms.  Denies any rash.  -4 children.  No miscarriages  -Denies any Raynaud's, dry eyes/mouth  Medications:  hydroxychloroquine (plaquenil) 200 mg daily for a few months  Prednisone 2.5 mg daily   Ibuprofen 400 mg BID prn  ==============================================================================================================  Visit: 10/03/24  Doing very well today.  Left knee still  bothering her.  Wants to come down on medications.  Medications/therapies:  methotrexate 25 mg weekly on Sunday  Folic acid  hydroxychloroquine (plaquenil) 200 mg twice daily   Enbrel weekly  Meloxicam 7.5 mg daily  ==============================================================================================================  Today's Visit: 02/06/25    Doing fairly well.  Does have some dorsal foot paresthesias of the right foot when driving for an hour or so.  Left knee still up-and-down.  Wants to come off medications if possible.  Still pretty active.  Lifting objects.  Back does pain her a bit when she overdoes it    Medications/therapies:  methotrexate 25 mg weekly on Sunday  Folic acid  hydroxychloroquine (plaquenil) 200 mg twice daily   Enbrel weekly  Meloxicam 7.5 mg daily    5 point ROS negative except noted above  I had reviewed past medical and family histories together with allergy and medication lists documented.      Medications:  Current Outpatient Medications on File Prior to Visit   Medication Sig Dispense Refill    omeprazole 20 MG Oral Capsule Delayed Release Take 1 capsule (20 mg total) by mouth nightly. TAKE AT BEDTIME      FEROSUL 325 (65 Fe) MG Oral Tab TAKE 1 TABLET BY MOUTH EVERY DAY WITH BREAKFAST 90 tablet 0    benzonatate 200 MG Oral Cap Take 1 capsule (200 mg total) by mouth 3 (three) times daily as needed. 30 capsule 0    ALPRAZOLAM 0.25 MG Oral Tab TAKE 1 TABLET(0.25 MG) BY MOUTH EVERY NIGHT AS NEEDED 15 tablet 0    HYDROXYCHLOROQUINE 200 MG Oral Tab TAKE 1 TABLET BY MOUTH TWICE DAILY (Patient taking differently: Take 1 tablet (200 mg total) by mouth daily.) 180 tablet 1    cyanocobalamin 1000 MCG Oral Tab Take 1 tablet (1,000 mcg total) by mouth daily. 90 tablet 3    Etanercept (ENBREL SURECLICK) 50 MG/ML Subcutaneous Solution Auto-injector Inject 50 mg into the skin once a week. (Patient taking differently: Inject 50 mg into the skin every 14 (fourteen) days.) 4 mL 0    FOLIC ACID 1  MG Oral Tab TAKE 1 TABLET BY MOUTH ONCE DAILY 90 tablet 3    cholecalciferol 50 MCG (2000 UT) Oral Cap Take 1 capsule (2,000 Units total) by mouth daily.      Acetaminophen (TYLENOL OR)       methylPREDNISolone 4 MG Oral Tablet Therapy Pack FOLLOW PACKAGE DIRECTIONS (Patient not taking: Reported on 2/6/2025) 21 each 0     No current facility-administered medications on file prior to visit.         Allergies:  No Known Allergies      Objective    Vitals:    02/06/25 1125   BP: 110/70   Pulse: 68   Resp: 16   Temp: 97.9 °F (36.6 °C)   SpO2: 97%   Weight: 164 lb (74.4 kg)   Height: 5' 5\" (1.651 m)     GEN: NAD, well-nourished.   HEENT: Head: NCAT. Face: No lesions. Eyes: Conjunctiva clear. Sclera are anicteric. PERRLA. EOMs are full.   CV: RRR, no mrg, S1/S2  PULM: easy effort  Extremities: No cyanosis, edema or deformities.   Neurologic: Strength, CN2-12 grossly intact   Psych: normal affect.   Skin: No lesions or rashes.  MSK: 28 joint count performed. No evidence of synovitis in mcp, pip, dip, wrist, elbows, shoulders, hips, knees, ankles, mtp unless otherwise noted. Full ROM of elbows, wrists, knees.     PtGA: 2.0  SJ: 0  TJ: 0  MDGA: 0    Bilateral MCP hypertrophy  -[place]-neck deformities of the right fingers (stable)  -Right MTP 1 hypertrophy.  No significant tenderness    Labs:  Additional Labs:      Lab Results   Component Value Date    WBC 4.8 12/26/2024    RBC 4.04 12/26/2024    HGB 12.9 12/26/2024    HCT 38.4 12/26/2024    .0 12/26/2024    MCV 95.0 12/26/2024    MCH 31.9 12/26/2024    MCHC 33.6 12/26/2024    RDW 13.2 12/26/2024    NEPRELIM 2.23 12/26/2024    NEPERCENT 47.0 12/26/2024    LYPERCENT 41.7 12/26/2024    MOPERCENT 8.6 12/26/2024    EOPERCENT 1.9 12/26/2024    BAPERCENT 0.6 12/26/2024    NE 2.23 12/26/2024    LYMABS 1.98 12/26/2024    MOABSO 0.41 12/26/2024    EOABSO 0.09 12/26/2024    BAABSO 0.03 12/26/2024     Lab Results   Component Value Date    GLU 95 12/26/2024    BUN 10 12/26/2024     CREATSERUM 0.66 12/26/2024    ANIONGAP 6 12/26/2024    CA 9.9 12/26/2024    OSMOCALC 293 12/26/2024    ALKPHO 101 12/26/2024    AST 23 12/26/2024    ALT 16 12/26/2024    BILT 0.5 12/26/2024    TP 7.4 12/26/2024    ALB 4.4 12/26/2024    GLOBULIN 3.0 12/26/2024     12/26/2024    K 4.1 12/26/2024     12/26/2024    CO2 28.0 12/26/2024 6/2023  Ferritin WNL  B12 WNL  Vitamin D 20.8  TSH WNL  CBC W differential WNL  Creatinine 0.72, rest of CMP WNL    3/2023  Ferritin 6.8  Percent sat 7  CBC W differential WNL besides a hemoglobin 11.3  Creatinine 0.72, rest of CMP WNL    1/2023  CBC with differential is normal, creatinine 0.67, rest of CMP is normal    12/2022  STEVEN 1: 640 homogenous  Vitamin B12 206  Uric acid 2.9  IGRA is negative  Hep B/C is negative  UPCR: 37.1/262    CRP 2.93, sed rate 75  C3/4 is negative   lupus anticoagulant, anticardiolipin IgG/IgM, beta-2 glycoprotein IgG/IgM is negative    7/24/2022  STEVEN is positive (no titer)  Extractable nuclear antigen testing by microbead assay (9 and SHELLI's tested): All negative besides chromatin  .7  Ferritin 40.24  WBC 7.3, hemoglobin 11.6, platelets 453  Creatinine 0.6, rest of CMP is normal  HCV negative  Hepatitis B surface antigen, hepatitis B surface antibody is negative  HIV is negative  Lipids are normal  PTH, TSH normal  Vitamin D 27.5      8/8/2022 D[facility]  Spine T score: -0.5  Left hip (T score): Femoral neck: -0.5, total hip: 0.1  Right hip (T score): Femoral neck 0.2, total hip: 0.4      Radiology:    Radiology review:      =====================================================================================================  Assessment and Plan  Assessment:  1. Rheumatoid arthritis involving multiple sites with positive rheumatoid factor (HCC)    2. Low back pain, unspecified back pain laterality, unspecified chronicity, unspecified whether sciatica present    3. Therapeutic drug monitoring    4. 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 Menopause    6. Low ferritin    7. Low vitamin B12 level    8. Anemia, unspecified type      #seropositive (+RF, +CCP ) rheumatoid arthritis.  Symptoms have been present since 2021, however irreversible articular damage findings potentially indicate chronicity beyond that period of time. Initial CDAI in 11/2022 was 61.   -Prior medications: leflunomide 10 mg daily for about a month as previously recommended.  She had no side effects but self discontinued the medication due to concerns about polypharmacy.  -Today, CDAI is 2 indicating remission.  Suspect residual arthralgia is more mechanical/degenerative in nature including swan-neck deformities, knee osteoarthritis, MTP osteoarthritis    #left knee osteoarthritis: Minimal benefit to corticosteroid injections in the past.     #Positive STEVEN, positive chromatin: Chromatin can be seen in SLE, however is not part of SLE classification criteria.  No extra-articular features to suggest SLE or another autoimmune disease    High risk medication labs including CMP and CBC w/ diff reviewed from 12/2024. Results are stable.   7/2024 ophtho exam (Dr. Qiu, [facility]) wnl    Plan:  Decrease vitamin B12 and iron pill to once a week.  Recheck if these levels with the next blood work.  Patient worried about polypharmacy so try to consolidate her regimen if possible.    Blood work in early April and early July  -for back pain Acetaminophen (Tylenol): Take two 650 mg extended release/arthritis strength capsules as needed.  Patient wants to avoid any NSAIDs given cardiovascular increased risk.    -continue methotrexate 8 pills weekly  -continue folic acid daily  -stop hydroxychloroquine (plaquenil)   -continue enbrel every 2 weeks    X-ray of the lumbar spine and right foot proposed to further adjudicate pain in these regions.  No recent trauma.  Patient declines for now.  Start off with home exercise program for the foot and ankle and spine from the  [employer]    DEXA given RA and postmenopausal status    GERD: every day PPI      Diagnoses and all orders for this visit:    Rheumatoid arthritis involving multiple sites with positive rheumatoid factor (HCC)  -     XR DEXA BONE DENSITOMETRY (CPT=77080); Future  -     Comp Metabolic Panel (14); Future  -     CBC With Differential With Platelet; Future  -     Comp Metabolic Panel (14); Future  -     CBC With Differential With Platelet; Future  -     Ferritin; Future  -     Vitamin B12; Future  -     methotrexate 2.5 MG Oral Tab; Take 1 tablet (2.5 mg total) by mouth once a week. TAKE 8 TABLETS BY MOUTH ONCE WEEKLY    Low back pain, unspecified back pain laterality, unspecified chronicity, unspecified whether sciatica present  -     Comp Metabolic Panel (14); Future  -     CBC With Differential With Platelet; Future  -     Ferritin; Future  -     Vitamin B12; Future    Therapeutic drug monitoring  -     Comp Metabolic Panel (14); Future  -     CBC With Differential With Platelet; Future    Immunodeficiency due to drug therapy (HCC)    Menopause  -     XR DEXA BONE DENSITOMETRY (CPT=77080); Future    Low ferritin  -     Comp Metabolic Panel (14); Future  -     CBC With Differential With Platelet; Future  -     Ferritin; Future  -     Vitamin B12; Future    Low vitamin B12 level  -     Comp Metabolic Panel (14); Future  -     CBC With Differential With Platelet; Future  -     Ferritin; Future  -     Vitamin B12; Future    Anemia, unspecified type          Return in about 24 weeks (around 7/24/2025).      The above plan of care, diagnosis, orders, and follow-up were discussed with the patient. Questions related to this recommended plan of care were answered.    Thank you for referring this delightful patient to me. Please feel free to contact me with any questions.     This report was performed utilizing speech recognition software technology. Despite proofreading, speech recognition errors  could escape detection. If a word or phrase is confusing or out of context, please do not hesitate to call for   clarification.       Kind regards      Virginia Salinas MD  [facility]

## 2025-02-11 ENCOUNTER — APPOINTMENT (OUTPATIENT)
Dept: GASTROENTEROLOGY | Age: 55
End: 2025-02-11
Attending: INTERNAL MEDICINE

## 2025-02-20 ENCOUNTER — TELEPHONE (OUTPATIENT)
Facility: CLINIC | Age: 55
End: 2025-02-20

## 2025-02-20 NOTE — TELEPHONE ENCOUNTER
Pt called office, has questions regarding medication instructions per Dr. Salinas. Reviewed Dr. Salinas LOV note from 2/7/25.     Decrease vitamin B12 and iron pill to once a week     Blood work in early April and early July  -for back pain Acetaminophen (Tylenol): Take two 650 mg extended release/arthritis strength capsules as needed.     -continue methotrexate 8 pills weekly  -continue folic acid daily  -stop hydroxychloroquine (plaquenil)   -continue enbrel every 2 weeks         Electronically signed by Virginia Salinas MD at 2/6/2025 11:52 AM    Pt asking appropriate questions and voiced understanding.

## 2025-03-10 DIAGNOSIS — M05.79 RHEUMATOID ARTHRITIS INVOLVING MULTIPLE SITES WITH POSITIVE RHEUMATOID FACTOR (HCC): ICD-10-CM

## 2025-03-11 RX ORDER — METHOTREXATE 2.5 MG/1
2.5 TABLET ORAL WEEKLY
Qty: 104 TABLET | Refills: 0 | OUTPATIENT
Start: 2025-03-11

## 2025-03-11 NOTE — TELEPHONE ENCOUNTER
Methotrexate 8 tabs weekly      Last office visit: 2/6/2025    Next Rheum Apt:7/24/2025 Virginia Salinas MD    Last fill: 2/6/2025 104 tab, 0 refills    Sending pt my chart message that there are refills at the pharmacy    Labs:   Lab Results   Component Value Date    CREATSERUM 0.66 12/26/2024    ALKPHO 101 12/26/2024    AST 23 12/26/2024    ALT 16 12/26/2024    BILT 0.5 12/26/2024    TP 7.4 12/26/2024    ALB 4.4 12/26/2024       Lab Results   Component Value Date    WBC 4.8 12/26/2024    HGB 12.9 12/26/2024    .0 12/26/2024    NEPRELIM 2.23 12/26/2024    NEPERCENT 47.0 12/26/2024    LYPERCENT 41.7 12/26/2024    NE 2.23 12/26/2024    LYMABS 1.98 12/26/2024

## 2025-03-13 RX ORDER — OMEPRAZOLE 20 MG/1
20 CAPSULE, DELAYED RELEASE ORAL NIGHTLY
Qty: 90 CAPSULE | Refills: 1 | Status: SHIPPED | OUTPATIENT
Start: 2025-03-13

## 2025-03-17 ENCOUNTER — TELEPHONE (OUTPATIENT)
Facility: CLINIC | Age: 55
End: 2025-03-17

## 2025-03-17 ENCOUNTER — VIRTUAL PHONE E/M (OUTPATIENT)
Dept: FAMILY MEDICINE CLINIC | Facility: CLINIC | Age: 55
End: 2025-03-17
Payer: COMMERCIAL

## 2025-03-17 DIAGNOSIS — R21 FACIAL RASH: ICD-10-CM

## 2025-03-17 DIAGNOSIS — J06.9 UPPER RESPIRATORY TRACT INFECTION, UNSPECIFIED TYPE: Primary | ICD-10-CM

## 2025-03-17 RX ORDER — AZITHROMYCIN 250 MG/1
TABLET, FILM COATED ORAL
Qty: 6 TABLET | Refills: 0 | Status: SHIPPED | OUTPATIENT
Start: 2025-03-17 | End: 2025-03-22

## 2025-03-17 RX ORDER — HYDROCORTISONE 25 MG/G
1 OINTMENT TOPICAL 2 TIMES DAILY PRN
Qty: 20 G | Refills: 0 | Status: SHIPPED | OUTPATIENT
Start: 2025-03-17

## 2025-03-17 NOTE — PROGRESS NOTES
Subjective:   Patient ID: Paz Ocampo is a 54 year old female.    HPI  Ms. Ocampo is a pleasant 54-year-old female with history of rheumatoid arthritis presenting for phone visit today for sore throat associate with cough since last Thursday of last week.  She had congestion and bodyaches and chills but when she checked her temperature it was normal at 97.8.  She had leftover amoxicillin after she took Tylenol and vitamin C and feels a bit better but then developed facial rash.  He had taken amoxicillin in the past and has not had any rash.  He is requesting a different kind of antibiotic and cream for her face.  She denies fever, chest pain, shortness of breath, nausea, vomiting, abdominal, diarrhea.      I had reviewed past medical and family histories together with allergy and medication lists documented.        History/Other:   Review of Systems   Gastrointestinal:  Negative for diarrhea, nausea and vomiting.     Current Outpatient Medications   Medication Sig Dispense Refill    azithromycin (ZITHROMAX Z-DESHAUN) 250 MG Oral Tab Take 2 tablets (500 mg total) by mouth daily for 1 day, THEN 1 tablet (250 mg total) daily for 4 days. 6 tablet 0    hydrocortisone 2.5 % External Ointment Apply 1 Application topically 2 (two) times daily as needed. 20 g 0    omeprazole 20 MG Oral Capsule Delayed Release Take 1 capsule (20 mg total) by mouth nightly. TAKE AT BEDTIME 90 capsule 1    methotrexate 2.5 MG Oral Tab Take 1 tablet (2.5 mg total) by mouth once a week. TAKE 8 TABLETS BY MOUTH ONCE WEEKLY 104 tablet 0    methylPREDNISolone 4 MG Oral Tablet Therapy Pack FOLLOW PACKAGE DIRECTIONS (Patient not taking: Reported on 2/6/2025) 21 each 0    FEROSUL 325 (65 Fe) MG Oral Tab TAKE 1 TABLET BY MOUTH EVERY DAY WITH BREAKFAST 90 tablet 0    benzonatate 200 MG Oral Cap Take 1 capsule (200 mg total) by mouth 3 (three) times daily as needed. 30 capsule 0    ALPRAZOLAM 0.25 MG Oral Tab TAKE 1 TABLET(0.25 MG) BY MOUTH EVERY NIGHT AS  NEEDED 15 tablet 0    HYDROXYCHLOROQUINE 200 MG Oral Tab TAKE 1 TABLET BY MOUTH TWICE DAILY (Patient taking differently: Take 1 tablet (200 mg total) by mouth daily.) 180 tablet 1    cyanocobalamin 1000 MCG Oral Tab Take 1 tablet (1,000 mcg total) by mouth daily. 90 tablet 3    Etanercept (ENBREL SURECLICK) 50 MG/ML Subcutaneous Solution Auto-injector Inject 50 mg into the skin once a week. (Patient taking differently: Inject 50 mg into the skin every 14 (fourteen) days.) 4 mL 0    FOLIC ACID 1 MG Oral Tab TAKE 1 TABLET BY MOUTH ONCE DAILY 90 tablet 3    cholecalciferol 50 MCG (2000 UT) Oral Cap Take 1 capsule (2,000 Units total) by mouth daily.      Acetaminophen (TYLENOL OR)        Allergies:Allergies[1]    Objective:   Physical Exam  Constitutional:       General: She is not in acute distress.  Neurological:      Mental Status: She is alert.         Assessment & Plan:   1. Upper respiratory tract infection, unspecified type   -Viral versus bacterial  - Trial of Z-Deshaun but go to the emergency room with respiratory distress and/or severe dehydration  - May take Tylenol as needed for fever pain   2. Facial rash   -Possibly viral exanthem versus reaction to amoxicillin  - Go to the emergency room with respiratory distress  - Will prescribe hydrocortisone 2.5% ointment as needed  - However I did ask her to follow-up sooner in the office to be evaluated better if symptoms worsen or persist     This note was prepared using Dragon Medical voice recognition dictation software. As a result errors may occur. When identified these errors have been corrected. While every attempt is made to correct errors during dictation discrepancies may still exist          No orders of the defined types were placed in this encounter.      Meds This Visit:  Requested Prescriptions     Signed Prescriptions Disp Refills    azithromycin (ZITHROMAX Z-DESHAUN) 250 MG Oral Tab 6 tablet 0     Sig: Take 2 tablets (500 mg total) by mouth daily for 1 day,  THEN 1 tablet (250 mg total) daily for 4 days.    hydrocortisone 2.5 % External Ointment 20 g 0     Sig: Apply 1 Application topically 2 (two) times daily as needed.       Imaging & Referrals:  None         [1] No Known Allergies

## 2025-03-17 NOTE — PROGRESS NOTES
Virtual Telephone Check-In    Paz Ocampo verbally consents to a Virtual/Telephone Check-In visit on 03/17/25.  Patient has been referred to the formerly [facility] website at [email or web address] to review the yearly Consent to Treat document.    Patient understands and accepts financial responsibility for any deductible, co-insurance and/or co-pays associated with this service.    Duration of the service: 30 minutes      Summary of topics discussed:             Fan Wiley MD

## 2025-03-17 NOTE — TELEPHONE ENCOUNTER
Methotrexate       Last office visit: 2/6/2025    Next Rheum Apt:7/24/2025 Virginia Salinas MD    Last fill: 2/6/2025 104 tab, 0 refills    My chart message sent with last refill request to reach out to the pharmacy.     Labs:   Lab Results   Component Value Date    CREATSERUM 0.66 12/26/2024    ALKPHO 101 12/26/2024    AST 23 12/26/2024    ALT 16 12/26/2024    BILT 0.5 12/26/2024    TP 7.4 12/26/2024    ALB 4.4 12/26/2024       Lab Results   Component Value Date    WBC 4.8 12/26/2024    HGB 12.9 12/26/2024    .0 12/26/2024    NEPRELIM 2.23 12/26/2024    NEPERCENT 47.0 12/26/2024    LYPERCENT 41.7 12/26/2024    NE 2.23 12/26/2024    LYMABS 1.98 12/26/2024

## 2025-03-19 ENCOUNTER — LAB ENCOUNTER (OUTPATIENT)
Dept: LAB | Age: 55
End: 2025-03-19
Attending: FAMILY MEDICINE
Payer: COMMERCIAL

## 2025-03-19 ENCOUNTER — VIRTUAL PHONE E/M (OUTPATIENT)
Dept: FAMILY MEDICINE CLINIC | Facility: CLINIC | Age: 55
End: 2025-03-19
Payer: COMMERCIAL

## 2025-03-19 ENCOUNTER — TELEPHONE (OUTPATIENT)
Facility: CLINIC | Age: 55
End: 2025-03-19

## 2025-03-19 DIAGNOSIS — M05.79 RHEUMATOID ARTHRITIS INVOLVING MULTIPLE SITES WITH POSITIVE RHEUMATOID FACTOR (HCC): ICD-10-CM

## 2025-03-19 DIAGNOSIS — H10.9 CONJUNCTIVITIS OF BOTH EYES, UNSPECIFIED CONJUNCTIVITIS TYPE: Primary | ICD-10-CM

## 2025-03-19 DIAGNOSIS — Z51.81 THERAPEUTIC DRUG MONITORING: ICD-10-CM

## 2025-03-19 LAB
ALBUMIN SERPL-MCNC: 4.5 G/DL (ref 3.2–4.8)
ALBUMIN/GLOB SERPL: 1.5 {RATIO} (ref 1–2)
ALP LIVER SERPL-CCNC: 85 U/L
ALT SERPL-CCNC: 24 U/L
ANION GAP SERPL CALC-SCNC: 5 MMOL/L (ref 0–18)
AST SERPL-CCNC: 23 U/L (ref ?–34)
BASOPHILS # BLD AUTO: 0.04 X10(3) UL (ref 0–0.2)
BASOPHILS NFR BLD AUTO: 0.7 %
BILIRUB SERPL-MCNC: 0.8 MG/DL (ref 0.3–1.2)
BUN BLD-MCNC: 9 MG/DL (ref 9–23)
CALCIUM BLD-MCNC: 9.5 MG/DL (ref 8.7–10.6)
CHLORIDE SERPL-SCNC: 104 MMOL/L (ref 98–112)
CO2 SERPL-SCNC: 32 MMOL/L (ref 21–32)
CREAT BLD-MCNC: 0.63 MG/DL
EGFRCR SERPLBLD CKD-EPI 2021: 106 ML/MIN/1.73M2 (ref 60–?)
EOSINOPHIL # BLD AUTO: 0.12 X10(3) UL (ref 0–0.7)
EOSINOPHIL NFR BLD AUTO: 2.1 %
ERYTHROCYTE [DISTWIDTH] IN BLOOD BY AUTOMATED COUNT: 13.2 %
FASTING STATUS PATIENT QL REPORTED: YES
GLOBULIN PLAS-MCNC: 3.1 G/DL (ref 2–3.5)
GLUCOSE BLD-MCNC: 92 MG/DL (ref 70–99)
HCT VFR BLD AUTO: 35.7 %
HGB BLD-MCNC: 11.8 G/DL
IMM GRANULOCYTES # BLD AUTO: 0.02 X10(3) UL (ref 0–1)
IMM GRANULOCYTES NFR BLD: 0.4 %
LYMPHOCYTES # BLD AUTO: 3.39 X10(3) UL (ref 1–4)
LYMPHOCYTES NFR BLD AUTO: 60.2 %
MCH RBC QN AUTO: 31.3 PG (ref 26–34)
MCHC RBC AUTO-ENTMCNC: 33.1 G/DL (ref 31–37)
MCV RBC AUTO: 94.7 FL
MONOCYTES # BLD AUTO: 0.53 X10(3) UL (ref 0.1–1)
MONOCYTES NFR BLD AUTO: 9.4 %
NEUTROPHILS # BLD AUTO: 1.53 X10 (3) UL (ref 1.5–7.7)
NEUTROPHILS # BLD AUTO: 1.53 X10(3) UL (ref 1.5–7.7)
NEUTROPHILS NFR BLD AUTO: 27.2 %
OSMOLALITY SERPL CALC.SUM OF ELEC: 290 MOSM/KG (ref 275–295)
PLATELET # BLD AUTO: 263 10(3)UL (ref 150–450)
POTASSIUM SERPL-SCNC: 4.1 MMOL/L (ref 3.5–5.1)
PROT SERPL-MCNC: 7.6 G/DL (ref 5.7–8.2)
RBC # BLD AUTO: 3.77 X10(6)UL
SODIUM SERPL-SCNC: 141 MMOL/L (ref 136–145)
WBC # BLD AUTO: 5.6 X10(3) UL (ref 4–11)

## 2025-03-19 PROCEDURE — 36415 COLL VENOUS BLD VENIPUNCTURE: CPT

## 2025-03-19 PROCEDURE — G2252 BRIEF CHKIN BY MD/QHP, 11-20: HCPCS | Performed by: FAMILY MEDICINE

## 2025-03-19 PROCEDURE — 85025 COMPLETE CBC W/AUTO DIFF WBC: CPT

## 2025-03-19 PROCEDURE — 80053 COMPREHEN METABOLIC PANEL: CPT

## 2025-03-19 RX ORDER — OFLOXACIN 3 MG/ML
1 SOLUTION/ DROPS OPHTHALMIC 4 TIMES DAILY
Qty: 10 ML | Refills: 0 | Status: SHIPPED | OUTPATIENT
Start: 2025-03-19

## 2025-03-19 RX ORDER — METHOTREXATE 2.5 MG/1
2.5 TABLET ORAL WEEKLY
Qty: 104 TABLET | Refills: 0 | OUTPATIENT
Start: 2025-03-19

## 2025-03-19 NOTE — PROGRESS NOTES
Subjective:   Patient ID: Paz Ocampo is a 54 year old female.    HPI  Ms. Ocampo is a very pleasant 54-year-old female presenting for phone visit today.  She has history of rheumatoid arthritis.  Previously she had a virtual visit with me and was prescribed with Z-Deshaun for upper respiratory infection.  She was also prescribed hydrocortisone ointment for facial rash.  Apparently she has been experiencing bilateral eye redness associated with itching at that time that I may not have heard.  She continues to have the symptoms.  No discharge that she reports.  No fever no headaches.      I had reviewed past medical and family histories together with allergy and medication lists documented.    History/Other:   Review of Systems   Constitutional:  Negative for fever.   Eyes:  Negative for pain and visual disturbance.   Respiratory:  Negative for shortness of breath.      Current Outpatient Medications   Medication Sig Dispense Refill    ofloxacin 0.3 % Ophthalmic Solution Apply 1 drop to eye 4 (four) times daily. 10 mL 0    azithromycin (ZITHROMAX Z-DESHAUN) 250 MG Oral Tab Take 2 tablets (500 mg total) by mouth daily for 1 day, THEN 1 tablet (250 mg total) daily for 4 days. 6 tablet 0    hydrocortisone 2.5 % External Ointment Apply 1 Application topically 2 (two) times daily as needed. 20 g 0    omeprazole 20 MG Oral Capsule Delayed Release Take 1 capsule (20 mg total) by mouth nightly. TAKE AT BEDTIME 90 capsule 1    methotrexate 2.5 MG Oral Tab Take 1 tablet (2.5 mg total) by mouth once a week. TAKE 8 TABLETS BY MOUTH ONCE WEEKLY 104 tablet 0    methylPREDNISolone 4 MG Oral Tablet Therapy Pack FOLLOW PACKAGE DIRECTIONS (Patient not taking: Reported on 2/6/2025) 21 each 0    FEROSUL 325 (65 Fe) MG Oral Tab TAKE 1 TABLET BY MOUTH EVERY DAY WITH BREAKFAST 90 tablet 0    benzonatate 200 MG Oral Cap Take 1 capsule (200 mg total) by mouth 3 (three) times daily as needed. 30 capsule 0    ALPRAZOLAM 0.25 MG Oral Tab TAKE 1  TABLET(0.25 MG) BY MOUTH EVERY NIGHT AS NEEDED 15 tablet 0    HYDROXYCHLOROQUINE 200 MG Oral Tab TAKE 1 TABLET BY MOUTH TWICE DAILY (Patient taking differently: Take 1 tablet (200 mg total) by mouth daily.) 180 tablet 1    cyanocobalamin 1000 MCG Oral Tab Take 1 tablet (1,000 mcg total) by mouth daily. 90 tablet 3    Etanercept (ENBREL SURECLICK) 50 MG/ML Subcutaneous Solution Auto-injector Inject 50 mg into the skin once a week. (Patient taking differently: Inject 50 mg into the skin every 14 (fourteen) days.) 4 mL 0    FOLIC ACID 1 MG Oral Tab TAKE 1 TABLET BY MOUTH ONCE DAILY 90 tablet 3    cholecalciferol 50 MCG (2000 UT) Oral Cap Take 1 capsule (2,000 Units total) by mouth daily.      Acetaminophen (TYLENOL OR)        Allergies:Allergies[1]    Objective:   Physical Exam  Constitutional:       General: She is not in acute distress.  Neurological:      Mental Status: She is alert.         Assessment & Plan:   1. Conjunctivitis of both eyes, unspecified conjunctivitis type      -We will treat with ofloxacin eyedrops as directed  - Avoid scratching to avoid spread  - Call or come in sooner if symptoms worsen or persist      This note was prepared using Dragon Medical voice recognition dictation software. As a result errors may occur. When identified these errors have been corrected. While every attempt is made to correct errors during dictation discrepancies may still exist          No orders of the defined types were placed in this encounter.      Meds This Visit:  Requested Prescriptions     Signed Prescriptions Disp Refills    ofloxacin 0.3 % Ophthalmic Solution 10 mL 0     Sig: Apply 1 drop to eye 4 (four) times daily.       Imaging & Referrals:  None         [1] No Known Allergies

## 2025-03-19 NOTE — PROGRESS NOTES
Virtual Telephone Check-In    Paz Ocampo verbally consents to a Virtual/Telephone Check-In visit on 03/19/25.  Patient has been referred to the [facility] website at [email or web address] to review the yearly Consent to Treat document.    Patient understands and accepts financial responsibility for any deductible, co-insurance and/or co-pays associated with this service.    Duration of the service: 20 minutes      Summary of topics discussed:             Fan Wiley MD

## 2025-04-10 ENCOUNTER — TELEPHONE (OUTPATIENT)
Dept: RHEUMATOLOGY | Facility: CLINIC | Age: 55
End: 2025-04-10

## 2025-04-10 DIAGNOSIS — M05.79 RHEUMATOID ARTHRITIS INVOLVING MULTIPLE SITES WITH POSITIVE RHEUMATOID FACTOR (HCC): Primary | ICD-10-CM

## 2025-04-10 RX ORDER — METHYLPREDNISOLONE 4 MG/1
TABLET ORAL
Qty: 1 EACH | Refills: 0 | Status: SHIPPED | OUTPATIENT
Start: 2025-04-10

## 2025-04-10 NOTE — TELEPHONE ENCOUNTER
Patient called office. Complains of pain to right fingers and hands. States she did not sleep well last night.     Inquiring if a Medrol dose pack can be ordered.    Inquiring if she can be seen sooner. Next appointment 7/24/25.     Currently taking Methotrexate (2.5mg) takes 8 tabs weekly on Tuesday.     Verified pharmacy.

## 2025-04-21 ENCOUNTER — HOSPITAL ENCOUNTER (OUTPATIENT)
Dept: BONE DENSITY | Age: 55
Discharge: HOME | End: 2025-04-21
Attending: INTERNAL MEDICINE
Payer: COMMERCIAL

## 2025-04-21 DIAGNOSIS — Z78.0 MENOPAUSE: ICD-10-CM

## 2025-04-21 DIAGNOSIS — M05.79 RHEUMATOID ARTHRITIS INVOLVING MULTIPLE SITES WITH POSITIVE RHEUMATOID FACTOR (HCC): ICD-10-CM

## 2025-04-21 PROCEDURE — 77080 DXA BONE DENSITY AXIAL: CPT | Performed by: INTERNAL MEDICINE

## 2025-04-22 DIAGNOSIS — F41.9 ANXIETY: ICD-10-CM

## 2025-04-22 RX ORDER — ALPRAZOLAM 0.25 MG
0.25 TABLET ORAL
Qty: 15 TABLET | Refills: 0 | Status: SHIPPED | OUTPATIENT
Start: 2025-04-22

## 2025-04-22 NOTE — TELEPHONE ENCOUNTER
Last office visit: 02/06/2025    Next Rheum Apt:7/24/2025 Virginia Salinas MD    Last fill: 11/12/2024    QTY: 15    Refills: 0    Labs:   Lab Results   Component Value Date    CREATSERUM 0.63 03/19/2025    ALKPHO 85 03/19/2025    AST 23 03/19/2025    ALT 24 03/19/2025    BILT 0.8 03/19/2025    TP 7.6 03/19/2025    ALB 4.5 03/19/2025       Lab Results   Component Value Date    WBC 5.6 03/19/2025    HGB 11.8 (L) 03/19/2025    .0 03/19/2025    NEPRELIM 1.53 03/19/2025    NEPERCENT 27.2 03/19/2025    LYPERCENT 60.2 03/19/2025    NE 1.53 03/19/2025    LYMABS 3.39 03/19/2025

## 2025-04-24 ENCOUNTER — OFFICE VISIT (OUTPATIENT)
Dept: RHEUMATOLOGY | Facility: CLINIC | Age: 55
End: 2025-04-24
Payer: COMMERCIAL

## 2025-04-24 VITALS
DIASTOLIC BLOOD PRESSURE: 74 MMHG | OXYGEN SATURATION: 100 % | HEART RATE: 78 BPM | SYSTOLIC BLOOD PRESSURE: 102 MMHG | HEIGHT: 65 IN | WEIGHT: 158 LBS | TEMPERATURE: 98 F | RESPIRATION RATE: 16 BRPM | BODY MASS INDEX: 26.33 KG/M2

## 2025-04-24 DIAGNOSIS — M25.552 BILATERAL HIP PAIN: ICD-10-CM

## 2025-04-24 DIAGNOSIS — Z79.899 IMMUNODEFICIENCY DUE TO DRUG THERAPY (HCC): ICD-10-CM

## 2025-04-24 DIAGNOSIS — Z51.81 THERAPEUTIC DRUG MONITORING: ICD-10-CM

## 2025-04-24 DIAGNOSIS — R79.0 LOW FERRITIN: ICD-10-CM

## 2025-04-24 DIAGNOSIS — M05.79 RHEUMATOID ARTHRITIS INVOLVING MULTIPLE SITES WITH POSITIVE RHEUMATOID FACTOR (HCC): Primary | ICD-10-CM

## 2025-04-24 DIAGNOSIS — M25.551 BILATERAL HIP PAIN: ICD-10-CM

## 2025-04-24 DIAGNOSIS — M54.50 LOW BACK PAIN, UNSPECIFIED BACK PAIN LATERALITY, UNSPECIFIED CHRONICITY, UNSPECIFIED WHETHER SCIATICA PRESENT: ICD-10-CM

## 2025-04-24 DIAGNOSIS — D84.821 IMMUNODEFICIENCY DUE TO DRUG THERAPY (HCC): ICD-10-CM

## 2025-04-24 PROCEDURE — 99214 OFFICE O/P EST MOD 30 MIN: CPT | Performed by: INTERNAL MEDICINE

## 2025-04-24 RX ORDER — FERROUS SULFATE 325(65) MG
1 TABLET ORAL
Qty: 90 TABLET | Refills: 1 | Status: SHIPPED | OUTPATIENT
Start: 2025-04-24

## 2025-04-24 RX ORDER — METHOTREXATE 2.5 MG/1
20 TABLET ORAL WEEKLY
Qty: 104 TABLET | Refills: 0 | Status: SHIPPED | OUTPATIENT
Start: 2025-04-24 | End: 2025-07-24

## 2025-04-24 NOTE — PROGRESS NOTES
Rheumatology f/u Patient Note  =====================================================================================================    Chief complaint: seropositive RA  Chief Complaint   Patient presents with    Rheumatoid Arthritis     3 month f/u. Feeling good. Did DEXA scan and would like to discuss results. Still having lower back pain. No stiffness in the morning. Converted rapid score of 1.3     PCP  Fan Wiley MD  Phone: [phone]    =====================================================================================================  HPI Date of visit: 11/9/2022    Paz Ocampo is a 54 year old female   Patient here for evaluation of a positive rheumatoid factor and suspected rheumatoid arthritis.  Patient is here with  who is helping to translate from [place].  Patient has a history of chronic polyarthralgia for the last 1 to 2 years.  All joints are affected.  She has received annual cortisone injections to the left knee which has helped.  She has received 2 injections of the knee in the last several months.  -She was found to have a positive rheumatoid factor in summer 2022 and was diagnosed by her PCP with rheumatoid arthritis.  She has been on hydroxychloroquine and a low-dose prednisone for the time being which has been somewhat helpful.  However, her pain remains quite severe.  -Works at [employer].  Work is difficult.  Hard to grasp objects  -Denies any shortness of breath or any other extra-articular symptoms.  Denies any rash.  -4 children.  No miscarriages  -Denies any Raynaud's, dry eyes/mouth  Medications:  hydroxychloroquine (plaquenil) 200 mg daily for a few months  Prednisone 2.5 mg daily   Ibuprofen 400 mg BID prn  ==============================================================================================================  Visit: 02/06/25  Doing fairly well.  Does have some dorsal foot paresthesias of the right foot when driving for an hour or so.  Left knee still  up-and-down.  Wants to come off medications if possible.  Still pretty active.  Lifting objects.  Back does pain her a bit when she overdoes it  Medications/therapies:  methotrexate 25 mg weekly on Sunday  Folic acid  hydroxychloroquine (plaquenil) 200 mg twice daily   Enbrel weekly  Meloxicam 7.5 mg daily  ==============================================================================================================  Today's Visit: 04/24/25    She recently underwent a bone density scan which showed slightly low bone density, not reaching the level of osteoporosis. She is concerned about the future progression of her bone density and inquires about the need for medication. She is currently taking vitamin D weekly and is interested in additional preventive measures.    She experiences pain in her hip, knee, back, and feet. She mentions specific pain in her hip and questions if it is related to her bone density issues.   - Doing fairly well in terms of RA.  - Chronic left knee pain still somewhat of an issue.  - Her joints hurt her requiring her to sit down from time to time.  She works at a  and she is standing all the time.    Her current medications include methotrexate 20 mg weekly, Enbrel every other week, folic acid, iron, vitamin B12, and vitamin D. She has stopped taking hydroxychloroquine. She takes eight tablets of methotrexate weekly and one tablet of folic acid and iron daily. Vitamin B12 and vitamin D are taken weekly.    Medications/therapies:  methotrexate 20 mg weekly on Sunday  Folic acid  Enbrel q2week      5 point ROS negative except noted above  I had reviewed past medical and family histories together with allergy and medication lists documented.        Medications:  Current Outpatient Medications on File Prior to Visit   Medication Sig Dispense Refill    Methotrexate 2.5 MG/ML Oral Solution Take 20 mg by mouth once a week.      ALPRAZOLAM 0.25 MG Oral Tab TAKE 1 TABLET(0.25 MG) BY MOUTH  EVERY NIGHT AS NEEDED 15 tablet 0    ofloxacin 0.3 % Ophthalmic Solution Apply 1 drop to eye 4 (four) times daily. 10 mL 0    omeprazole 20 MG Oral Capsule Delayed Release Take 1 capsule (20 mg total) by mouth nightly. TAKE AT BEDTIME 90 capsule 1    benzonatate 200 MG Oral Cap Take 1 capsule (200 mg total) by mouth 3 (three) times daily as needed. 30 capsule 0    cyanocobalamin 1000 MCG Oral Tab Take 1 tablet (1,000 mcg total) by mouth daily. 90 tablet 3    Etanercept (ENBREL SURECLICK) 50 MG/ML Subcutaneous Solution Auto-injector Inject 50 mg into the skin once a week. (Patient taking differently: Inject 50 mg into the skin every 14 (fourteen) days.) 4 mL 0    cholecalciferol 50 MCG (2000 UT) Oral Cap Take 1 capsule (2,000 Units total) by mouth daily. (Patient taking differently: Take 1,000 Units by mouth in the morning.)      Acetaminophen (TYLENOL OR)       Ferrous Sulfate (FEROSUL) 325 (65 Fe) MG Oral Tab Take 1 tablet (325 mg total) by mouth daily with breakfast. 90 tablet 1    hydrocortisone 2.5 % External Ointment Apply 1 Application topically 2 (two) times daily as needed. 20 g 0    FOLIC ACID 1 MG Oral Tab TAKE 1 TABLET BY MOUTH ONCE DAILY 90 tablet 3     No current facility-administered medications on file prior to visit.         Allergies:  No Known Allergies      Objective    Vitals:    04/24/25 1332   BP: 102/74   Pulse: 78   Resp: 16   Temp: 97.7 °F (36.5 °C)   SpO2: 100%   Weight: 158 lb (71.7 kg)   Height: 5' 5\" (1.651 m)     GEN: NAD, well-nourished.   HEENT: Head: NCAT. Face: No lesions. Eyes: Conjunctiva clear. Sclera are anicteric. PERRLA. EOMs are full.   PULM: CTAB  Extremities: No cyanosis, edema or deformities.   Neurologic: Strength, CN2-12 grossly intact   Psych: normal affect.   Skin: No lesions or rashes.  MSK: 28 joint count performed. No evidence of synovitis in mcp, pip, dip, wrist, elbows, shoulders, hips, knees, ankles, mtp unless otherwise noted. Full ROM of elbows, wrists, knees.      PtGA: 2.0  SJ: 0  TJ: 1 (left knee)  MDGA: 0    Bilateral MCP hypertrophy  -[place]-neck deformities of the right fingers (stable)  - Tender palpation in the bilateral lumbar paraspinals, lower posterior hip  Labs:  Additional Labs:      Lab Results   Component Value Date    WBC 5.6 03/19/2025    RBC 3.77 (L) 03/19/2025    HGB 11.8 (L) 03/19/2025    HCT 35.7 03/19/2025    .0 03/19/2025    MCV 94.7 03/19/2025    MCH 31.3 03/19/2025    MCHC 33.1 03/19/2025    RDW 13.2 03/19/2025    NEPRELIM 1.53 03/19/2025    NEPERCENT 27.2 03/19/2025    LYPERCENT 60.2 03/19/2025    MOPERCENT 9.4 03/19/2025    EOPERCENT 2.1 03/19/2025    BAPERCENT 0.7 03/19/2025    NE 1.53 03/19/2025    LYMABS 3.39 03/19/2025    MOABSO 0.53 03/19/2025    EOABSO 0.12 03/19/2025    BAABSO 0.04 03/19/2025     Lab Results   Component Value Date    GLU 92 03/19/2025    BUN 9 03/19/2025    CREATSERUM 0.63 03/19/2025    ANIONGAP 5 03/19/2025    CA 9.5 03/19/2025    OSMOCALC 290 03/19/2025    ALKPHO 85 03/19/2025    AST 23 03/19/2025    ALT 24 03/19/2025    BILT 0.8 03/19/2025    TP 7.6 03/19/2025    ALB 4.5 03/19/2025    GLOBULIN 3.1 03/19/2025     03/19/2025    K 4.1 03/19/2025     03/19/2025    CO2 32.0 03/19/2025 6/2023  Ferritin WNL  B12 WNL  Vitamin D 20.8  TSH WNL  CBC W differential WNL  Creatinine 0.72, rest of CMP WNL    3/2023  Ferritin 6.8  Percent sat 7  CBC W differential WNL besides a hemoglobin 11.3  Creatinine 0.72, rest of CMP WNL    1/2023  CBC with differential is normal, creatinine 0.67, rest of CMP is normal    12/2022  STEVEN 1: 640 homogenous  Vitamin B12 206  Uric acid 2.9  IGRA is negative  Hep B/C is negative  UPCR: 37.1/262    CRP 2.93, sed rate 75  C3/4 is negative   lupus anticoagulant, anticardiolipin IgG/IgM, beta-2 glycoprotein IgG/IgM is negative    7/24/2022  STEVEN is positive (no titer)  Extractable nuclear antigen testing by microbead assay (9 and SHELLI's tested): All negative besides  chromatin  .7  Ferritin 40.24  WBC 7.3, hemoglobin 11.6, platelets 453  Creatinine 0.6, rest of CMP is normal  HCV negative  Hepatitis B surface antigen, hepatitis B surface antibody is negative  HIV is negative  Lipids are normal  PTH, TSH normal  Vitamin D 27.5      8/8/2022 DEXA from [facility]  Spine T score: -0.5  Left hip (T score): Femoral neck: -0.5, total hip: 0.1  Right hip (T score): Femoral neck 0.2, total hip: 0.4      Radiology:    Radiology review:  XR DEXA BONE DENSITOMETRY (CPT=77080)  Result Date: 4/21/2025  CONCLUSION:  BMD is consistent with osteopenia.  Ten year risk for major osteoporotic fracture and hip fracture are 5.2% and 0.3% respectively.  Recommendation:  The Bone Health and Osteoporosis Foundation (BHOF) recommends pharmacological treatment for patients with a FRAX 10-year risk score of 3% or higher for a hip fracture or 20% or higher for a major osteoporotic fracture, to prevent osteoporosis and reduce fracture risk. All treatment decisions require clinical judgment and consideration of individual patient factors, including patient preferences, comorbidities, previous drug use, risk fractures not captured in the FRAX model (e.g. frailty, falls, vitamin D deficiency, increased bone turnover, interval significant decline in bone density) and possible under- or over-estimation of fracture risk by FRAX. Additional information regarding the FRAX model can be found at the BHOF website: bonehealthandosteoporosis.org.  The World Health Organization has defined the following categories based on bone density: Normal bone:  T-score greater than or equal to -1 Osteopenia: T-score  less than -1 and greater  than -2.5 Osteoporosis:  T-score less than or equal -2.5   LOCATION:  [facility]     Dictated by (CST): Herminio Anaya DO on 4/21/2025 at 3:49 PM     Finalized by (ZE): Herminio Anaya DO on 4/21/2025 at 3:49 PM        =====================================================================================================  Assessment and Plan  Assessment:  1. Rheumatoid arthritis involving multiple sites with positive rheumatoid factor (HCC)    2. Low back pain, unspecified back pain laterality, unspecified chronicity, unspecified whether sciatica present    3. Low ferritin    4. 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 Bilateral hip pain    6. Therapeutic drug monitoring      #seropositive (+RF, +CCP ) rheumatoid arthritis.  Symptoms have been present since 2021, however irreversible articular damage findings potentially indicate chronicity beyond that period of time. Initial CDAI in 11/2022 was 61.   -Today, CDAI is 3 indicating low disease activity.  Suspect residual arthralgia is more mechanical/degenerative in nature including [place]an-neck deformities, knee osteoarthritis, MTP osteoarthritis    #left knee osteoarthritis: Minimal benefit to corticosteroid injections in the past.     #Osteopenia  - 2025 with low risk osteopenia    #Positive STEVEN, positive chromatin: Chromatin can be seen in SLE, however is not part of SLE classification criteria.  No extra-articular features to suggest SLE or another autoimmune disease    High risk medication labs including CMP and CBC w/ diff reviewed from 3/2025. Results are stable.   7/2024 ophtho exam (Dr. Qiu, [facility]) wnl  -2022: HBsAg, HBcAB total negative, HCV neg, IGRA neg      Plan:  -for back pain/hip.  --X-rays of the bilateral hips and lumbar spine given osteopenia  --Acetaminophen (Tylenol): Take two 650 mg extended release/arthritis strength capsules as needed.  Patient wants to avoid any NSAIDs given cardiovascular increased risk.    -continue methotrexate 8 pills weekly  -continue folic acid daily  -continue enbrel every 2 weeks    Osteopenia  - Secondary osteopenia workup ordered  -Get Citralcal over-the-counter. Take 2 pills with dinner, 1 pill in the  morning  -While on chronic steroids: Goal daily intake is 1000 to 1200 mg of calcium. Goal intake is about 2000 international units of vitamin D3    GERD: every day PPI    Xanax 0.25 mg nightly.  For anxiety.  This was being filled by prior PCP until she established with a current PCP.  Next refill will forward to PCP    Rtc 6 months     Diagnoses and all orders for this visit:    Rheumatoid arthritis involving multiple sites with positive rheumatoid factor (HCC)  -     Comp Metabolic Panel (14); Future  -     CBC With Differential With Platelet; Future  -     PTH, Intact; Future  -     Phosphorus; Future  -     Magnesium; Future  -     Vitamin D; Future  -     Comp Metabolic Panel (14); Future  -     CBC With Differential With Platelet; Future  -     XR LUMBAR SPINE (MIN 4 VIEWS) (CPT=72110); Future  -     XR HIP W OR WO PELVIS 3 OR 4 VIEWS, BILAT(CPT=73522); Future  -     Methotrexate Sodium 2.5 MG Oral Tab; Take 8 tablets (20 mg total) by mouth once a week.    Low back pain, unspecified back pain laterality, unspecified chronicity, unspecified whether sciatica present  -     Comp Metabolic Panel (14); Future  -     CBC With Differential With Platelet; Future  -     PTH, Intact; Future  -     Phosphorus; Future  -     Magnesium; Future  -     Vitamin D; Future  -     XR LUMBAR SPINE (MIN 4 VIEWS) (CPT=72110); Future  -     Methotrexate Sodium 2.5 MG Oral Tab; Take 8 tablets (20 mg total) by mouth once a week.    Low ferritin  -     Comp Metabolic Panel (14); Future  -     CBC With Differential With Platelet; Future  -     PTH, Intact; Future  -     Phosphorus; Future  -     Magnesium; Future  -     Vitamin D; Future  -     Methotrexate Sodium 2.5 MG Oral Tab; Take 8 tablets (20 mg total) by mouth once a week.    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    Bilateral hip pain  -     XR HIP W OR WO PELVIS 3 OR 4 VIEWS, BILAT(CPT=73522); Future    Therapeutic drug monitoring            No follow-ups on file.      The above plan of care, diagnosis, orders, and follow-up were discussed with the patient. Questions related to this recommended plan of care were answered.    Thank you for referring this delightful patient to me. Please feel free to contact me with any questions.     This report was performed utilizing speech recognition software technology. Despite proofreading, speech recognition errors could escape detection. If a word or phrase is confusing or out of context, please do not hesitate to call for   clarification.       Kind regards      Virginia Salinas MD  [facility]

## 2025-04-24 NOTE — PATIENT INSTRUCTIONS
Get Citralcal over-the-counter. Take 2 pills with dinner, 1 pill in the morning  While on chronic steroids: Goal daily intake is 1000 to 1200 mg of calcium. Goal intake is about 2000 international units of vitamin D3    3.   Here is a link to a guide that shows calcium content in select foods:   https://www.nof.org/patients/treatment/calciumvitamin-d/a-guide-to-calcium-rich-foods/          From:   [email or web address]://[email or web address]/health/treatment-tests-and-therapies/bone-densitometry

## 2025-04-24 NOTE — PROGRESS NOTES
The following individual(s) verbally consented to be recorded using ambient AI listening technology and understand that they can each withdraw their consent to this listening technology at any point by asking the clinician to turn off or pause the recording:    Patient name: Paz Ocampo  Additional names:

## 2025-04-24 NOTE — TELEPHONE ENCOUNTER
Last office visit: 02/06/2025    Next Rheum Apt:7/24/2025 Virginia Salinas MD    Last fill: 01/29/2025    QTY: 15    Refills: 0    Labs:   Component      Latest Ref Rng 12/26/2024   Iron, Serum      50 - 170 ug/dL 58    Transferrin      250 - 380 mg/dL 262    Iron Bind.Cap.(TIBC)      250 - 425 ug/dL 330    Iron Saturation      15 - 50 % 18    FERRITIN      50 - 306 ng/mL 59      Lab Results   Component Value Date    CREATSERUM 0.63 03/19/2025    ALKPHO 85 03/19/2025    AST 23 03/19/2025    ALT 24 03/19/2025    BILT 0.8 03/19/2025    TP 7.6 03/19/2025    ALB 4.5 03/19/2025       Lab Results   Component Value Date    WBC 5.6 03/19/2025    HGB 11.8 (L) 03/19/2025    .0 03/19/2025    NEPRELIM 1.53 03/19/2025    NEPERCENT 27.2 03/19/2025    LYPERCENT 60.2 03/19/2025    NE 1.53 03/19/2025    LYMABS 3.39 03/19/2025

## 2025-04-28 ENCOUNTER — TELEPHONE (OUTPATIENT)
Facility: CLINIC | Age: 55
End: 2025-04-28

## 2025-04-28 DIAGNOSIS — K21.9 GASTROESOPHAGEAL REFLUX DISEASE, UNSPECIFIED WHETHER ESOPHAGITIS PRESENT: Primary | ICD-10-CM

## 2025-04-28 RX ORDER — OMEPRAZOLE 20 MG/1
20 CAPSULE, DELAYED RELEASE ORAL NIGHTLY
Qty: 90 CAPSULE | Refills: 1 | Status: SHIPPED | OUTPATIENT
Start: 2025-04-28

## 2025-04-28 NOTE — TELEPHONE ENCOUNTER
Patient called office and would like Dr. Salinas to add her dx of Rheumatoid Arthritis to her work letter. Patient would like her employer to know why she has to sit.     Letter pended and routed to Dr. Salinas

## 2025-04-28 NOTE — TELEPHONE ENCOUNTER
Last office visit: 4/24/2025    Next Rheum Apt:10/2/2025 Virginia Salinas MD    Last fill: omeprazole 20 mg 3/13/2025  90 capsules, 1 refill     Labs:   Lab Results   Component Value Date    CREATSERUM 0.63 03/19/2025    ALKPHO 85 03/19/2025    AST 23 03/19/2025    ALT 24 03/19/2025    BILT 0.8 03/19/2025    TP 7.6 03/19/2025    ALB 4.5 03/19/2025       Lab Results   Component Value Date    WBC 5.6 03/19/2025    HGB 11.8 (L) 03/19/2025    .0 03/19/2025    NEPRELIM 1.53 03/19/2025    NEPERCENT 27.2 03/19/2025    LYPERCENT 60.2 03/19/2025    NE 1.53 03/19/2025    LYMABS 3.39 03/19/2025

## 2025-04-29 ENCOUNTER — TELEPHONE (OUTPATIENT)
Facility: CLINIC | Age: 55
End: 2025-04-29

## 2025-05-12 ENCOUNTER — TELEPHONE (OUTPATIENT)
Facility: CLINIC | Age: 55
End: 2025-05-12

## 2025-05-13 NOTE — TELEPHONE ENCOUNTER
Ok to draft work letter. Route to me when done.    Call pt. Ok to stop Enbrel. Can try cymbalta which is an anxiety and pain medication instead. If pt is agreeable, I can send this in.

## 2025-05-13 NOTE — TELEPHONE ENCOUNTER
Spoke with patient. She states she works at  and needs a note with restrictions for work. She is requesting a note that she is not able to lift more than 20lbs and when she has pain to hands she is not able to clean.   State she has had no improvement while taking Enbrel inquiring if she can stop Enbrel in July and if there is another medication she can take. States she will be out of the country in June. States she has pain from left knee to left foot.

## 2025-05-14 ENCOUNTER — TELEPHONE (OUTPATIENT)
Facility: CLINIC | Age: 55
End: 2025-05-14

## 2025-05-14 DIAGNOSIS — M05.79 RHEUMATOID ARTHRITIS INVOLVING MULTIPLE SITES WITH POSITIVE RHEUMATOID FACTOR (HCC): Primary | ICD-10-CM

## 2025-05-14 NOTE — TELEPHONE ENCOUNTER
Spoke with patient. States she stayed home from work today due to pain in bilateral knees. She is not able to walk due to the pain. She has taken Tylenol and Ibuprofen with no help. She is requested an earlier appointment and/injection.   She is agreeable to take Cymbalta. Requesting Methotrexate for the month of June, she receives supple for 28 days. Instructed to call [facility] regarding need for Methotrexate from June 7th to July 7th. Or she will need to pay out of pocket for the month supply. Understanding verbalized.     Drafted letter routed to Dr. Salinas.

## 2025-05-15 ENCOUNTER — HOSPITAL ENCOUNTER (OUTPATIENT)
Dept: GENERAL RADIOLOGY | Age: 55
Discharge: HOME | End: 2025-05-15
Attending: INTERNAL MEDICINE
Payer: COMMERCIAL

## 2025-05-15 ENCOUNTER — OFFICE VISIT (OUTPATIENT)
Dept: RHEUMATOLOGY | Facility: CLINIC | Age: 55
End: 2025-05-15
Payer: COMMERCIAL

## 2025-05-15 ENCOUNTER — TELEPHONE (OUTPATIENT)
Dept: RHEUMATOLOGY | Facility: CLINIC | Age: 55
End: 2025-05-15

## 2025-05-15 VITALS — DIASTOLIC BLOOD PRESSURE: 60 MMHG | SYSTOLIC BLOOD PRESSURE: 102 MMHG

## 2025-05-15 DIAGNOSIS — M25.462 EFFUSION OF LEFT KNEE: ICD-10-CM

## 2025-05-15 DIAGNOSIS — M25.562 ACUTE PAIN OF LEFT KNEE: ICD-10-CM

## 2025-05-15 DIAGNOSIS — M05.79 RHEUMATOID ARTHRITIS INVOLVING MULTIPLE SITES WITH POSITIVE RHEUMATOID FACTOR (HCC): ICD-10-CM

## 2025-05-15 DIAGNOSIS — M05.79 RHEUMATOID ARTHRITIS INVOLVING MULTIPLE SITES WITH POSITIVE RHEUMATOID FACTOR (HCC): Primary | ICD-10-CM

## 2025-05-15 PROCEDURE — 89060 EXAM SYNOVIAL FLUID CRYSTALS: CPT | Performed by: INTERNAL MEDICINE

## 2025-05-15 PROCEDURE — 89050 BODY FLUID CELL COUNT: CPT | Performed by: INTERNAL MEDICINE

## 2025-05-15 PROCEDURE — 73562 X-RAY EXAM OF KNEE 3: CPT | Performed by: INTERNAL MEDICINE

## 2025-05-15 PROCEDURE — 87070 CULTURE OTHR SPECIMN AEROBIC: CPT | Performed by: INTERNAL MEDICINE

## 2025-05-15 PROCEDURE — 99214 OFFICE O/P EST MOD 30 MIN: CPT | Performed by: INTERNAL MEDICINE

## 2025-05-15 PROCEDURE — 87205 SMEAR GRAM STAIN: CPT | Performed by: INTERNAL MEDICINE

## 2025-05-15 PROCEDURE — 89051 BODY FLUID CELL COUNT: CPT | Performed by: INTERNAL MEDICINE

## 2025-05-15 PROCEDURE — 20610 DRAIN/INJ JOINT/BURSA W/O US: CPT | Performed by: INTERNAL MEDICINE

## 2025-05-15 RX ORDER — TRIAMCINOLONE ACETONIDE 40 MG/ML
40 INJECTION, SUSPENSION INTRA-ARTICULAR; INTRAMUSCULAR ONCE
Status: COMPLETED | OUTPATIENT
Start: 2025-05-15 | End: 2025-05-15

## 2025-05-15 RX ORDER — IBUPROFEN 200 MG
1 CAPSULE ORAL DAILY
COMMUNITY

## 2025-05-15 RX ADMIN — TRIAMCINOLONE ACETONIDE 40 MG: 40 INJECTION, SUSPENSION INTRA-ARTICULAR; INTRAMUSCULAR at 15:24:00

## 2025-05-15 NOTE — TELEPHONE ENCOUNTER
Patient had an office visit today with Dr. Salinas and is requesting a restriction letter for work.

## 2025-05-15 NOTE — PROGRESS NOTES
Procedure Note: left knee aspiration.    The risks, benefits, and alternatives of the procedure were explained, and verbal consent was obtained. Area over left lateral suprapatellar aspect of knee was prepped with chlorhexidine 2% x 3 and then 70% isopropyl alcohol stick swab x 3 and numbed with ethyl choloride spray. 25-gauge needle was inserted, 2.5 cc of lidocaine was injected in subcutaneous space.  The area was recleansed with isopropyl alcohol swab stick X3.  18 g needle inserted and 25 mL of inflammatory appearing fluid aspirated.  40 mg of Kenalog and 2 mL of 1% lidocaine was injected in the joint space with the same needle. Patient tolerated procedure well without any immediate complications.

## 2025-05-15 NOTE — PROGRESS NOTES
Rheumatology f/u Patient Note  =====================================================================================================    Chief complaint: seropositive RA  Chief Complaint   Patient presents with    Injection     Left knee injection     PCP  Fan Wiley MD  Phone: [phone]    =====================================================================================================  HPI Date of visit: 11/9/2022    Paz Ocampo is a 54 year old female   Patient here for evaluation of a positive rheumatoid factor and suspected rheumatoid arthritis.  Patient is here with  who is helping to translate from Irish.  Patient has a history of chronic polyarthralgia for the last 1 to 2 years.  All joints are affected.  She has received annual cortisone injections to the left knee which has helped.  She has received 2 injections of the knee in the last several months.  -She was found to have a positive rheumatoid factor in summer 2022 and was diagnosed by her PCP with rheumatoid arthritis.  She has been on hydroxychloroquine and a low-dose prednisone for the time being which has been somewhat helpful.  However, her pain remains quite severe.  -Works at [employer].  Work is difficult.  Hard to grasp objects  -Denies any shortness of breath or any other extra-articular symptoms.  Denies any rash.  -4 children.  No miscarriages  -Denies any Raynaud's, dry eyes/mouth  Medications:  hydroxychloroquine (plaquenil) 200 mg daily for a few months  Prednisone 2.5 mg daily   Ibuprofen 400 mg BID prn  ==============================================================================================================  Visit: 04/24/25  She recently underwent a bone density scan which showed slightly low bone density, not reaching the level of osteoporosis. She is concerned about the future progression of her bone density and inquires about the need for medication. She is currently taking vitamin D weekly and is interested  in additional preventive measures.  She experiences pain in her hip, knee, back, and feet. She mentions specific pain in her hip and questions if it is related to her bone density issues.   - Doing fairly well in terms of RA.  - Chronic left knee pain still somewhat of an issue.  - Her joints hurt her requiring her to sit down from time to time.  She works at a  and she is standing all the time.  Her current medications include methotrexate 20 mg weekly, Enbrel every other week, folic acid, iron, vitamin B12, and vitamin D. She has stopped taking hydroxychloroquine. She takes eight tablets of methotrexate weekly and one tablet of folic acid and iron daily. Vitamin B12 and vitamin D are taken weekly.  Medications/therapies:  methotrexate 20 mg weekly on Sunday  Folic acid  Enbrel q2week  ==============================================================================================================  Today's Visit: 05/15/25    Left knee swelling was noted acutely.  Hard to walk.  Denies any trauma.  Continues on methotrexate 20 mg weekly and Enbrel every 2 weeks.    5 point ROS negative except noted above  I had reviewed past medical and family histories together with allergy and medication lists documented.    Medications:  Current Outpatient Medications on File Prior to Visit   Medication Sig Dispense Refill    Calcium Citrate 950 (200 Ca) MG Oral Tab Take 1 tablet (950 mg total) by mouth daily.      omeprazole 20 MG Oral Capsule Delayed Release Take 1 capsule (20 mg total) by mouth nightly. TAKE AT BEDTIME 90 capsule 1    Ferrous Sulfate (FEROSUL) 325 (65 Fe) MG Oral Tab Take 1 tablet (325 mg total) by mouth daily with breakfast. 90 tablet 1    Methotrexate Sodium 2.5 MG Oral Tab Take 8 tablets (20 mg total) by mouth once a week. 104 tablet 0    ALPRAZOLAM 0.25 MG Oral Tab TAKE 1 TABLET(0.25 MG) BY MOUTH EVERY NIGHT AS NEEDED 15 tablet 0    ofloxacin 0.3 % Ophthalmic Solution Apply 1 drop to eye 4 (four) times  daily. 10 mL 0    hydrocortisone 2.5 % External Ointment Apply 1 Application topically 2 (two) times daily as needed. 20 g 0    benzonatate 200 MG Oral Cap Take 1 capsule (200 mg total) by mouth 3 (three) times daily as needed. 30 capsule 0    cyanocobalamin 1000 MCG Oral Tab Take 1 tablet (1,000 mcg total) by mouth daily. 90 tablet 3    FOLIC ACID 1 MG Oral Tab TAKE 1 TABLET BY MOUTH ONCE DAILY 90 tablet 3    cholecalciferol 50 MCG (2000 UT) Oral Cap Take 1 capsule (2,000 Units total) by mouth daily.      Acetaminophen (TYLENOL OR)        No current facility-administered medications on file prior to visit.         Allergies:  No Known Allergies      Objective    Vitals:    05/15/25 1114   BP: 102/60     GEN: NAD, well-nourished.   HEENT: Head: NCAT. Face: No lesions. Eyes: Conjunctiva clear.   PULM:  easy effort  Extremities: No cyanosis, edema or deformities.   Neurologic: Strength, CN2-12 grossly intact   Skin: No lesions or rashes.  MSK: 28 joint count performed. No evidence of synovitis in mcp, pip, dip, wrist, elbows, shoulders, hips, knees, ankles, mtp unless otherwise noted. Full ROM of elbows, wrists, knees.     Left knee effusion with moderate suprapatellar effusion    Labs:  Additional Labs:    Lab Results   Component Value Date    WBC 5.6 03/19/2025    RBC 3.77 (L) 03/19/2025    HGB 11.8 (L) 03/19/2025    HCT 35.7 03/19/2025    .0 03/19/2025    MCV 94.7 03/19/2025    MCH 31.3 03/19/2025    MCHC 33.1 03/19/2025    RDW 13.2 03/19/2025    NEPRELIM 1.53 03/19/2025    NEPERCENT 27.2 03/19/2025    LYPERCENT 60.2 03/19/2025    MOPERCENT 9.4 03/19/2025    EOPERCENT 2.1 03/19/2025    BAPERCENT 0.7 03/19/2025    NE 1.53 03/19/2025    LYMABS 3.39 03/19/2025    MOABSO 0.53 03/19/2025    EOABSO 0.12 03/19/2025    BAABSO 0.04 03/19/2025     Lab Results   Component Value Date    GLU 92 03/19/2025    BUN 9 03/19/2025    CREATSERUM 0.63 03/19/2025    ANIONGAP 5 03/19/2025    CA 9.5 03/19/2025    OSMOCALC 290  03/19/2025    ALKPHO 85 03/19/2025    AST 23 03/19/2025    ALT 24 03/19/2025    BILT 0.8 03/19/2025    TP 7.6 03/19/2025    ALB 4.5 03/19/2025    GLOBULIN 3.1 03/19/2025     03/19/2025    K 4.1 03/19/2025     03/19/2025    CO2 32.0 03/19/2025 6/2023  Ferritin WNL  B12 WNL  Vitamin D 20.8  TSH WNL  CBC W differential WNL  Creatinine 0.72, rest of CMP WNL    3/2023  Ferritin 6.8  Percent sat 7  CBC W differential WNL besides a hemoglobin 11.3  Creatinine 0.72, rest of CMP WNL    1/2023  CBC with differential is normal, creatinine 0.67, rest of CMP is normal    12/2022  STEVEN 1: 640 homogenous  Vitamin B12 206  Uric acid 2.9  IGRA is negative  Hep B/C is negative  UPCR: 37.1/262    CRP 2.93, sed rate 75  C3/4 is negative   lupus anticoagulant, anticardiolipin IgG/IgM, beta-2 glycoprotein IgG/IgM is negative    7/24/2022  STEVEN is positive (no titer)  Extractable nuclear antigen testing by microbead assay (9 and SHELLI's tested): All negative besides chromatin  .7  Ferritin 40.24  WBC 7.3, hemoglobin 11.6, platelets 453  Creatinine 0.6, rest of CMP is normal  HCV negative  Hepatitis B surface antigen, hepatitis B surface antibody is negative  HIV is negative  Lipids are normal  PTH, TSH normal  Vitamin D 27.5      8[facility]8/2022 [facility]  Spine T score: -0.5  Left hip (T score): Femoral neck: -0.5, total hip: 0.1  Right hip (T score): Femoral neck 0.2, total hip: 0.4      Radiology:  XR KNEE (3 VIEWS), LEFT (CPT=73562)  Result Date: 5/15/2025  PROCEDURE:  XR KNEE ROUTINE (3 VIEWS), LEFT (CPT=73562)  TECHNIQUE:  Three views were obtained including patellar view.  COMPARISON:  None.  INDICATIONS:  PATIENT STATED HISTORY: (As transcribed by Technologist)  Patient complains of pain and swelling to her left knee X 2 days.  She denies any known injury.  She was at her physician's office PTA, and had a Cortisone injection as well as fluid drained from the knee.     FINDINGS:   No evidence of  fracture or dislocation  Small suprapatellar joint effusion.  Mild loss of patellofemoral joint space.  Mild loss of medial joint space.  Marginal enthesophytes.  Patellar and femoral condylar osteophytes.   IMPRESSION:  Moderate osteoarthritic changes in the left knee.   LOCATION:  [facility]   Dictated by (CST): Pete Prather MD on 5/15/2025 at 12:46 PM     Finalized by (CST): Pete Prather MD on 5/15/2025 at 12:47 PM       XR DEXA BONE DENSITOMETRY (CPT=77080)  Result Date: 4/21/2025  CONCLUSION:  BMD is consistent with osteopenia.  Ten year risk for major osteoporotic fracture and hip fracture are 5.2% and 0.3% respectively.  Recommendation:  The Bone Health and Osteoporosis Foundation (BHOF) recommends pharmacological treatment for patients with a FRAX 10-year risk score of 3% or higher for a hip fracture or 20% or higher for a major osteoporotic fracture, to prevent osteoporosis and reduce fracture risk. All treatment decisions require clinical judgment and consideration of individual patient factors, including patient preferences, comorbidities, previous drug use, risk fractures not captured in the FRAX model (e.g. frailty, falls, vitamin D deficiency, increased bone turnover, interval significant decline in bone density) and possible under- or over-estimation of fracture risk by FRAX. Additional information regarding the FRAX model can be found at the BHOF website: bonehealthandosteoporosis.org.  The World Health Organization has defined the following categories based on bone density: Normal bone:  T-score greater than or equal to -1 Osteopenia: T-score  less than -1 and greater  than -2.5 Osteoporosis:  T-score less than or equal -2.5   LOCATION:  [facility]     Dictated by (CST): Herminio Anaya DO on 4/21/2025 at 3:49 PM     Finalized by (CST): Herminio Anaya DO on 4/21/2025 at 3:49 PM           Radiology review:  XR DEXA BONE DENSITOMETRY (CPT=77080)  Result Date: 4/21/2025  CONCLUSION:  BMD is consistent  with osteopenia.  Ten year risk for major osteoporotic fracture and hip fracture are 5.2% and 0.3% respectively.  Recommendation:  The Bone Health and Osteoporosis Foundation (BHOF) recommends pharmacological treatment for patients with a FRAX 10-year risk score of 3% or higher for a hip fracture or 20% or higher for a major osteoporotic fracture, to prevent osteoporosis and reduce fracture risk. All treatment decisions require clinical judgment and consideration of individual patient factors, including patient preferences, comorbidities, previous drug use, risk fractures not captured in the FRAX model (e.g. frailty, falls, vitamin D deficiency, increased bone turnover, interval significant decline in bone density) and possible under- or over-estimation of fracture risk by FRAX. Additional information regarding the FRAX model can be found at the BHOF website: bonehealthandosteoporosis.org.  The World Health Organization has defined the following categories based on bone density: Normal bone:  T-score greater than or equal to -1 Osteopenia: T-score  less than -1 and greater  than -2.5 Osteoporosis:  T-score less than or equal -2.5   LOCATION:  [facility]     Dictated by (CST): Herminio Anaya DO on 4/21/2025 at 3:49 PM     Finalized by (CST): Herminio Anaya DO on 4/21/2025 at 3:49 PM       =====================================================================================================  Assessment and Plan  Assessment:  1. Rheumatoid arthritis involving multiple sites with positive rheumatoid factor (HCC)    2. Acute pain of left knee    3. Effusion of left knee      #Left knee effusion  - Unclear etiology.  Atraumatic    #seropositive (+RF, +CCP ) rheumatoid arthritis.  Symptoms have been present since 2021, however irreversible articular damage findings potentially indicate chronicity beyond that period of time. Initial CDAI in 11/2022 was 61.   - Outside of the left knee, RA appears to be quiescent    #left knee  osteoarthritis: Minimal benefit to corticosteroid injections in the past.     #Osteopenia  - 2025 with low risk osteopenia    #Positive STEVEN, positive chromatin: Chromatin can be seen in SLE, however is not part of SLE classification criteria.  No extra-articular features to suggest SLE or another autoimmune disease    High risk medication labs including CMP and CBC w/ diff reviewed from 3/2025. Results are stable.   7/2024 ophtho exam (Dr. Qiu, [facility]) wnl  -2022: HBsAg, HBcAB total negative, HCV neg, IGRA neg    Plan:  -Left knee effusion.  Aspirated 25 mL of slightly inflammatory appearing fluid.  40 mg of Kenalog injected into the knee.  Send off for culture and stain and cell count/differential    X-ray of the left knee to evaluate for fracture    -continue methotrexate 8 pills weekly  -continue folic acid daily  -continue enbrel every 2 weeks for now.  Depending on synovial fluid results, may require transition to an alternative biologic agent.  Discussed increasing Enbrel to weekly but the patient declines   Given she does not think there is any benefit whatsoever even with every 2-week dosing.    Osteopenia  - Secondary osteopenia workup ordered  -Get Citralcal over-the-counter. Take 2 pills with dinner, 1 pill in the morning  -While on chronic steroids: Goal daily intake is 1000 to 1200 mg of calcium. Goal intake is about 2000 international units of vitamin D3    GERD: every day PPI    Xanax 0.25 mg nightly.  For anxiety.  Next refill will forward to PCP    Rtc 6 months     Diagnoses and all orders for this visit:    Rheumatoid arthritis involving multiple sites with positive rheumatoid factor (HCC)  -     XR KNEE (3 VIEWS), LEFT (CPT=73562); Future  -     Cell Count/Diff & Crystals, Synovial; Future  -     Body Fluid Culture(aerobic & anaerobic); Future  -     triamcinolone acetonide (Kenalog-40) 40 MG/ML injection 40 mg    Acute pain of left knee  -     XR KNEE (3 VIEWS), LEFT (CPT=73562); Future  -      Cell Count/Diff & Crystals, Synovial; Future  -     Body Fluid Culture(aerobic & anaerobic); Future  -     triamcinolone acetonide (Kenalog-40) 40 MG/ML injection 40 mg    Effusion of left knee  -     XR KNEE (3 VIEWS), LEFT (CPT=73562); Future  -     Cell Count/Diff & Crystals, Synovial; Future  -     Body Fluid Culture(aerobic & anaerobic); Future  -     triamcinolone acetonide (Kenalog-40) 40 MG/ML injection 40 mg            No follow-ups on file.      The above plan of care, diagnosis, orders, and follow-up were discussed with the patient. Questions related to this recommended plan of care were answered.    Thank you for referring this delightful patient to me. Please feel free to contact me with any questions.     This report was performed utilizing speech recognition software technology. Despite proofreading, speech recognition errors could escape detection. If a word or phrase is confusing or out of context, please do not hesitate to call for   clarification.       Kind regards      Virginia Salinas MD  [facility]

## 2025-05-16 DIAGNOSIS — G89.29 CHRONIC MUSCULOSKELETAL PAIN: Primary | ICD-10-CM

## 2025-05-16 DIAGNOSIS — M79.18 CHRONIC MUSCULOSKELETAL PAIN: Primary | ICD-10-CM

## 2025-05-16 LAB
BASOPHILS NFR SNV: 0 %
COLOR FLD: YELLOW
EOSINOPHIL NFR SNV: 0 %
LYMPHOCYTES NFR SNV: 18 %
MONOS+MACROS NFR SNV: 14 %
NEUTROPHILS NFR FLD: 68 %
RBC # FLD AUTO: 1582 /CUMM (ref ?–1)
TOTAL CELLS COUNTED FLD: 100
TOTAL CELLS COUNTED SNV: 756 /CUMM (ref 0–200)
WBC # SNV: 756 /CUMM

## 2025-05-16 RX ORDER — DULOXETIN HYDROCHLORIDE 30 MG/1
30 CAPSULE, DELAYED RELEASE ORAL DAILY
Qty: 90 CAPSULE | Refills: 1 | Status: SHIPPED | OUTPATIENT
Start: 2025-05-16

## 2025-05-16 RX ORDER — OFLOXACIN 3 MG/ML
1 SOLUTION/ DROPS OPHTHALMIC 4 TIMES DAILY
Qty: 10 ML | Refills: 0 | OUTPATIENT
Start: 2025-05-16

## 2025-05-16 NOTE — TELEPHONE ENCOUNTER
Spoke with patient. States she is feeling better. States she only has \"a little\" pain. Able to walk better. Requests a new note to work no more than 6 hours in a day.     Inquiring if Dr. Salinas needs to see her before she leaves on trip on 6/7/25. States she will start Cymbalta as recommended.   Verified pharmacy.     Letter drafted

## 2025-05-22 ENCOUNTER — OFFICE VISIT (OUTPATIENT)
Dept: RHEUMATOLOGY | Facility: CLINIC | Age: 55
End: 2025-05-22
Payer: COMMERCIAL

## 2025-05-22 VITALS
WEIGHT: 159 LBS | HEIGHT: 65 IN | DIASTOLIC BLOOD PRESSURE: 78 MMHG | RESPIRATION RATE: 16 BRPM | HEART RATE: 74 BPM | SYSTOLIC BLOOD PRESSURE: 114 MMHG | OXYGEN SATURATION: 99 % | TEMPERATURE: 98 F | BODY MASS INDEX: 26.49 KG/M2

## 2025-05-22 DIAGNOSIS — M05.79 RHEUMATOID ARTHRITIS INVOLVING MULTIPLE SITES WITH POSITIVE RHEUMATOID FACTOR (HCC): ICD-10-CM

## 2025-05-22 DIAGNOSIS — M25.462 EFFUSION OF LEFT KNEE: Primary | ICD-10-CM

## 2025-05-22 DIAGNOSIS — M25.562 CHRONIC PAIN OF BOTH KNEES: ICD-10-CM

## 2025-05-22 DIAGNOSIS — G89.29 CHRONIC PAIN OF BOTH KNEES: ICD-10-CM

## 2025-05-22 DIAGNOSIS — M25.561 CHRONIC PAIN OF BOTH KNEES: ICD-10-CM

## 2025-05-22 DIAGNOSIS — M17.12 PRIMARY OSTEOARTHRITIS OF LEFT KNEE: ICD-10-CM

## 2025-05-22 PROCEDURE — 99214 OFFICE O/P EST MOD 30 MIN: CPT | Performed by: INTERNAL MEDICINE

## 2025-05-22 NOTE — PROGRESS NOTES
Rheumatology f/u Patient Note  =====================================================================================================    Chief complaint: seropositive RA  Chief Complaint   Patient presents with    Rheumatoid Arthritis     4 week f/u. Feeling better. Knee injection helped and is able to walk better. No other joint pain or swelling. No stiffness in the morning. Converted rapid score of  0.7     PCP  Fan Wiley MD  Phone: [phone]    =====================================================================================================  HPI Date of visit: 11/9/2022    Paz Ocampo is a 54 year old female   Patient here for evaluation of a positive rheumatoid factor and suspected rheumatoid arthritis.  Patient is here with  who is helping to translate from Mongolian.  Patient has a history of chronic polyarthralgia for the last 1 to 2 years.  All joints are affected.  She has received annual cortisone injections to the left knee which has helped.  She has received 2 injections of the knee in the last several months.  -She was found to have a positive rheumatoid factor in summer 2022 and was diagnosed by her PCP with rheumatoid arthritis.  She has been on hydroxychloroquine and a low-dose prednisone for the time being which has been somewhat helpful.  However, her pain remains quite severe.  -Works at [employer].  Work is difficult.  Hard to grasp objects  -Denies any shortness of breath or any other extra-articular symptoms.  Denies any rash.  -4 children.  No miscarriages  -Denies any Raynaud's, dry eyes/mouth  Medications:  hydroxychloroquine (plaquenil) 200 mg daily for a few months  Prednisone 2.5 mg daily   Ibuprofen 400 mg BID prn  ==============================================================================================================  Visit: 04/24/25  She recently underwent a bone density scan which showed slightly low bone density, not reaching the level of osteoporosis. She is  concerned about the future progression of her bone density and inquires about the need for medication. She is currently taking vitamin D weekly and is interested in additional preventive measures.  She experiences pain in her hip, knee, back, and feet. She mentions specific pain in her hip and questions if it is related to her bone density issues.   - Doing fairly well in terms of RA.  - Chronic left knee pain still somewhat of an issue.  - Her joints hurt her requiring her to sit down from time to time.  She works at a  and she is standing all the time.  Her current medications include methotrexate 20 mg weekly, Enbrel every other week, folic acid, iron, vitamin B12, and vitamin D. She has stopped taking hydroxychloroquine. She takes eight tablets of methotrexate weekly and one tablet of folic acid and iron daily. Vitamin B12 and vitamin D are taken weekly.  Medications/therapies:  methotrexate 20 mg weekly on Sunday  Folic acid  Enbrel q2week  ==============================================================================================================  Visit: 05/15/25  Left knee swelling was noted acutely.  Hard to walk.  Denies any trauma.  Continues on methotrexate 20 mg weekly and Enbrel every 2 weeks.  ==============================================================================================================  Visit: 05/22/25    Here with  on phone. Left knee is a lot better after drainage and corticosteroid injection.  Patient with several questions about what is going on with the left knee.    5 point ROS negative except noted above  I had reviewed past medical and family histories together with allergy and medication lists documented.    Medications:  Current Outpatient Medications on File Prior to Visit   Medication Sig Dispense Refill    Calcium Citrate 950 (200 Ca) MG Oral Tab Take 1 tablet (950 mg total) by mouth daily.      omeprazole 20 MG Oral Capsule Delayed Release Take 1 capsule  (20 mg total) by mouth nightly. TAKE AT BEDTIME 90 capsule 1    Ferrous Sulfate (FEROSUL) 325 (65 Fe) MG Oral Tab Take 1 tablet (325 mg total) by mouth daily with breakfast. 90 tablet 1    Methotrexate Sodium 2.5 MG Oral Tab Take 8 tablets (20 mg total) by mouth once a week. 104 tablet 0    ALPRAZOLAM 0.25 MG Oral Tab TAKE 1 TABLET(0.25 MG) BY MOUTH EVERY NIGHT AS NEEDED 15 tablet 0    ofloxacin 0.3 % Ophthalmic Solution Apply 1 drop to eye 4 (four) times daily. 10 mL 0    hydrocortisone 2.5 % External Ointment Apply 1 Application topically 2 (two) times daily as needed. 20 g 0    benzonatate 200 MG Oral Cap Take 1 capsule (200 mg total) by mouth 3 (three) times daily as needed. 30 capsule 0    cyanocobalamin 1000 MCG Oral Tab Take 1 tablet (1,000 mcg total) by mouth daily. 90 tablet 3    FOLIC ACID 1 MG Oral Tab TAKE 1 TABLET BY MOUTH ONCE DAILY 90 tablet 3    cholecalciferol 50 MCG (2000 UT) Oral Cap Take 1 capsule (2,000 Units total) by mouth daily.      Acetaminophen (TYLENOL OR)        No current facility-administered medications on file prior to visit.         Allergies:  No Known Allergies      Objective    Vitals:    05/22/25 1607   BP: 114/78   Pulse: 74   Resp: 16   Temp: 97.7 °F (36.5 °C)   SpO2: 99%   Weight: 159 lb (72.1 kg)   Height: 5' 5\" (1.651 m)     GEN: NAD, well-nourished.   HEENT: Head: NCAT. Face: No lesions. Eyes: Conjunctiva clear.   PULM:  easy effort  Extremities: No cyanosis, edema or deformities.   Neurologic: Strength, CN2-12 grossly intact   Skin: No lesions or rashes.  MSK: 28 joint count performed. No evidence of synovitis in mcp, pip, dip, wrist, elbows, shoulders, hips, knees, ankles, mtp unless otherwise noted. Full ROM of elbows, wrists, knees.     Left knee effusion with minimal suprapatellar effusion but there is synovial hypertrophy    Labs:  Additional Labs:    5/2025  Left knee synovial fluid: 756 WBC, no crystals    Lab Results   Component Value Date    WBC 5.6 03/19/2025     RBC 3.77 (L) 03/19/2025    HGB 11.8 (L) 03/19/2025    HCT 35.7 03/19/2025    .0 03/19/2025    MCV 94.7 03/19/2025    MCH 31.3 03/19/2025    MCHC 33.1 03/19/2025    RDW 13.2 03/19/2025    NEPRELIM 1.53 03/19/2025    NEPERCENT 27.2 03/19/2025    LYPERCENT 60.2 03/19/2025    MOPERCENT 9.4 03/19/2025    EOPERCENT 2.1 03/19/2025    BAPERCENT 0.7 03/19/2025    NE 1.53 03/19/2025    LYMABS 3.39 03/19/2025    MOABSO 0.53 03/19/2025    EOABSO 0.12 03/19/2025    BAABSO 0.04 03/19/2025     Lab Results   Component Value Date    GLU 92 03/19/2025    BUN 9 03/19/2025    CREATSERUM 0.63 03/19/2025    ANIONGAP 5 03/19/2025    CA 9.5 03/19/2025    OSMOCALC 290 03/19/2025    ALKPHO 85 03/19/2025    AST 23 03/19/2025    ALT 24 03/19/2025    BILT 0.8 03/19/2025    TP 7.6 03/19/2025    ALB 4.5 03/19/2025    GLOBULIN 3.1 03/19/2025     03/19/2025    K 4.1 03/19/2025     03/19/2025    CO2 32.0 03/19/2025 6/2023  Ferritin WNL  B12 WNL  Vitamin D 20.8  TSH WNL  CBC W differential WNL  Creatinine 0.72, rest of CMP WNL    3/2023  Ferritin 6.8  Percent sat 7  CBC W differential WNL besides a hemoglobin 11.3  Creatinine 0.72, rest of CMP WNL    1/2023  CBC with differential is normal, creatinine 0.67, rest of CMP is normal    12/2022  STEVEN 1: 640 homogenous  Vitamin B12 206  Uric acid 2.9  IGRA is negative  Hep B/C is negative  UPCR: 37.1/262    CRP 2.93, sed rate 75  C3/4 is negative   lupus anticoagulant, anticardiolipin IgG/IgM, beta-2 glycoprotein IgG/IgM is negative    7/24/2022  STEVEN is positive (no titer)  Extractable nuclear antigen testing by microbead assay (9 and SHELLI's tested): All negative besides chromatin  .7  Ferritin 40.24  WBC 7.3, hemoglobin 11.6, platelets 453  Creatinine 0.6, rest of CMP is normal  HCV negative  Hepatitis B surface antigen, hepatitis B surface antibody is negative  HIV is negative  Lipids are normal  PTH, TSH normal  Vitamin D 27.5      8/8/2022 DEXA from [facility]  Spine T score: -0.5  Left hip (T score): Femoral neck: -0.5, total hip: 0.1  Right hip (T score): Femoral neck 0.2, total hip: 0.4      Radiology:  XR KNEE (3 VIEWS), LEFT (CPT=73562)  Result Date: 5/15/2025  PROCEDURE:  XR KNEE ROUTINE (3 VIEWS), LEFT (CPT=73562)  TECHNIQUE:  Three views were obtained including patellar view.  COMPARISON:  None.  INDICATIONS:  PATIENT STATED HISTORY: (As transcribed by Technologist)  Patient complains of pain and swelling to her left knee X 2 days.  She denies any known injury.  She was at her physician's office PTA, and had a Cortisone injection as well as fluid drained from the knee.     FINDINGS:   No evidence of fracture or dislocation  Small suprapatellar joint effusion.  Mild loss of patellofemoral joint space.  Mild loss of medial joint space.  Marginal enthesophytes.  Patellar and femoral condylar osteophytes.   IMPRESSION:  Moderate osteoarthritic changes in the left knee.   LOCATION:  [facility]   Dictated by (CST): Pete Prather MD on 5/15/2025 at 12:46 PM     Finalized by (CST): Pete Prather MD on 5/15/2025 at 12:47 PM       XR DEXA BONE DENSITOMETRY (CPT=77080)  Result Date: 4/21/2025  CONCLUSION:  BMD is consistent with osteopenia.  Ten year risk for major osteoporotic fracture and hip fracture are 5.2% and 0.3% respectively.  Recommendation:  The Bone Health and Osteoporosis Foundation (BHOF) recommends pharmacological treatment for patients with a FRAX 10-year risk score of 3% or higher for a hip fracture or 20% or higher for a major osteoporotic fracture, to prevent osteoporosis and reduce fracture risk. All treatment decisions require clinical judgment and consideration of individual patient factors, including patient preferences, comorbidities, previous drug use, risk fractures not captured in the FRAX model (e.g. frailty, falls, vitamin D deficiency, increased bone turnover, interval significant decline in bone density) and possible under- or  over-estimation of fracture risk by FRAX. Additional information regarding the FRAX model can be found at the BHOF website: bonehealthandosteoporosis.org.  The World Health Organization has defined the following categories based on bone density: Normal bone:  T-score greater than or equal to -1 Osteopenia: T-score  less than -1 and greater  than -2.5 Osteoporosis:  T-score less than or equal -2.5   LOCATION:  [facility]     Dictated by (CST): Herminio Anaya DO on 4/21/2025 at 3:49 PM     Finalized by (CST): Herminio Anaya DO on 4/21/2025 at 3:49 PM           Radiology review:  XR DEXA BONE DENSITOMETRY (CPT=77080)  Result Date: 4/21/2025  CONCLUSION:  BMD is consistent with osteopenia.  Ten year risk for major osteoporotic fracture and hip fracture are 5.2% and 0.3% respectively.  Recommendation:  The Bone Health and Osteoporosis Foundation (BHOF) recommends pharmacological treatment for patients with a FRAX 10-year risk score of 3% or higher for a hip fracture or 20% or higher for a major osteoporotic fracture, to prevent osteoporosis and reduce fracture risk. All treatment decisions require clinical judgment and consideration of individual patient factors, including patient preferences, comorbidities, previous drug use, risk fractures not captured in the FRAX model (e.g. frailty, falls, vitamin D deficiency, increased bone turnover, interval significant decline in bone density) and possible under- or over-estimation of fracture risk by FRAX. Additional information regarding the FRAX model can be found at the BHOF website: bonehealthandosteoporosis.org.  The World Health Organization has defined the following categories based on bone density: Normal bone:  T-score greater than or equal to -1 Osteopenia: T-score  less than -1 and greater  than -2.5 Osteoporosis:  T-score less than or equal -2.5   LOCATION:  [facility]     Dictated by (CST): Herminio Anaya DO on 4/21/2025 at 3:49 PM     Finalized by (CST): Herminio Anaya DO on  4/21/2025 at 3:49 PM       XR KNEE (3 VIEWS), LEFT (CPT=73562)  Result Date: 5/15/2025  PROCEDURE:  XR KNEE ROUTINE (3 VIEWS), LEFT (CPT=73562)  TECHNIQUE:  Three views were obtained including patellar view.  COMPARISON:  None.  INDICATIONS:  PATIENT STATED HISTORY: (As transcribed by Technologist)  Patient complains of pain and swelling to her left knee X 2 days.  She denies any known injury.  She was at her physician's office PTA, and had a Cortisone injection as well as fluid drained from the knee.     FINDINGS:   No evidence of fracture or dislocation  Small suprapatellar joint effusion.  Mild loss of patellofemoral joint space.  Mild loss of medial joint space.  Marginal enthesophytes.  Patellar and femoral condylar osteophytes.   IMPRESSION:  Moderate osteoarthritic changes in the left knee.   LOCATION:  [facility]   Dictated by (CST): Pete Prather MD on 5/15/2025 at 12:46 PM     Finalized by (CST): Pete Prather MD on 5/15/2025 at 12:47 PM       XR DEXA BONE DENSITOMETRY (CPT=77080)  Result Date: 4/21/2025  CONCLUSION:  BMD is consistent with osteopenia.  Ten year risk for major osteoporotic fracture and hip fracture are 5.2% and 0.3% respectively.  Recommendation:  The Bone Health and Osteoporosis Foundation (BHOF) recommends pharmacological treatment for patients with a FRAX 10-year risk score of 3% or higher for a hip fracture or 20% or higher for a major osteoporotic fracture, to prevent osteoporosis and reduce fracture risk. All treatment decisions require clinical judgment and consideration of individual patient factors, including patient preferences, comorbidities, previous drug use, risk fractures not captured in the FRAX model (e.g. frailty, falls, vitamin D deficiency, increased bone turnover, interval significant decline in bone density) and possible under- or over-estimation of fracture risk by FRAX. Additional information regarding the FRAX model can be found at the BHOF website:  bonehealthandosteoporosis.org.  The World Health Organization has defined the following categories based on bone density: Normal bone:  T-score greater than or equal to -1 Osteopenia: T-score  less than -1 and greater  than -2.5 Osteoporosis:  T-score less than or equal -2.5   LOCATION:  [facility]     Dictated by (CST): Herminio Anaya DO on 4/21/2025 at 3:49 PM     Finalized by (CST): Herminio Anaya DO on 4/21/2025 at 3:49 PM         =====================================================================================================  Assessment and Plan  Assessment:  1. Effusion of left knee    2. Chronic pain of both knees    3. Primary osteoarthritis of left knee    4. Rheumatoid arthritis involving multiple sites with positive rheumatoid factor (HCC)      #Left knee effusion  -5/2025 synovial fluid consistent with noninflammatory causes  - Suspect due to knee osteoarthritis or possible internal derangement  - Knee pain and swelling has resolved after aspiration and corticosteroid injection  #Left knee osteoarthritis: Has had multiple corticosteroid injections by an outside doctor in the past without any significant improvement  - Did not tolerate Cymbalta due to dizziness    #seropositive (+RF, +CCP ) rheumatoid arthritis.  Symptoms have been present since 2021, however irreversible articular damage findings potentially indicate chronicity beyond that period of time. Initial CDAI in 11/2022 was 61.   - RA is quiescent and in remission    #Osteopenia  - 2025 with low risk osteopenia    #Positive STEVEN, positive chromatin: Chromatin can be seen in SLE, however is not part of SLE classification criteria.  No extra-articular features to suggest SLE or another autoimmune disease    High risk medication labs including CMP and CBC w/ diff reviewed from 3/2025. Results are stable.   7/2024 ophtho exam (Dr. Qiu, [facility]) wnl  -2022: HBsAg, HBcAB total negative, HCV neg, IGRA neg    Plan:  -Very long discussion regarding  the pathophysiology of osteoarthritis which may lead to synovial membrane irritation and increasing synovial fluid production.  Patient and  recall strenuous exercise with moving large cases of water from [place] immediately antecedent to left knee swelling.  I suspect that this was the inciting event of her injury.  - I reviewed her left knee x-ray with her today that did not demonstrate any significant effusion or any fracture.  There is mild OA changes of the left knee  - Refer to physical therapy for left knee osteoarthritis    Repeat blood work in early July 2025 and in early October 2025    -Stop Cymbalta    -continue methotrexate 8 pills weekly  -continue folic acid daily  -continue enbrel every 2 weeks for now.      Osteopenia  - Secondary osteopenia workup ordered  -Get Citralcal over-the-counter. Take 2 pills with dinner, 1 pill in the morning  -While on chronic steroids: Goal daily intake is 1000 to 1200 mg of calcium. Goal intake is about 2000 international units of vitamin D3    GERD: every day PPI    Xanax 0.25 mg nightly.  For anxiety.  Next refill will forward to PCP    Rtc 6 months     A total of 25 minutes were spent face-to-face with the patient and  during this encounter and over half of that time was spent on counseling and coordination of care.      Diagnoses and all orders for this visit:    Effusion of left knee  -     Physical Therapy Referral - [facility]  -     Physical Therapy Referral - External    Chronic pain of both knees  -     Physical Therapy Referral - [facility]  -     Physical Therapy Referral - External    Primary osteoarthritis of left knee  -     Physical Therapy Referral - [facility]  -     Physical Therapy Referral - External    Rheumatoid arthritis involving multiple sites with positive rheumatoid factor (HCC)              No follow-ups on file.      The above plan of care, diagnosis, orders, and follow-up were discussed with the patient.  Questions related to this recommended plan of care were answered.    Thank you for referring this delightful patient to me. Please feel free to contact me with any questions.     This report was performed utilizing speech recognition software technology. Despite proofreading, speech recognition errors could escape detection. If a word or phrase is confusing or out of context, please do not hesitate to call for   clarification.       Kind regards      Virginia Salinas MD  [facility]

## 2025-05-27 ENCOUNTER — TELEPHONE (OUTPATIENT)
Facility: CLINIC | Age: 55
End: 2025-05-27

## 2025-05-27 DIAGNOSIS — M05.79 RHEUMATOID ARTHRITIS INVOLVING MULTIPLE SITES WITH POSITIVE RHEUMATOID FACTOR (HCC): Primary | ICD-10-CM

## 2025-05-27 RX ORDER — LEUCOVORIN CALCIUM 5 MG/1
5 TABLET ORAL WEEKLY
Qty: 13 TABLET | Refills: 3 | Status: SHIPPED | OUTPATIENT
Start: 2025-05-27

## 2025-05-27 NOTE — TELEPHONE ENCOUNTER
Tell pt to decrease methotrexate to 6 pills weekly. Start leucovorin the day after methotrexate.

## 2025-05-27 NOTE — TELEPHONE ENCOUNTER
Called patient left detailed message that Dr. Salinas is ok to try stopping methotrexate and increase Enbrel to weekly dosing. Advised to call our office with additional questions.

## 2025-05-27 NOTE — TELEPHONE ENCOUNTER
Called pt to relay provider message as outlined below. Verified pt name and .   Pt verbalized she would like to completely discontinue taking methotrexate all together and would prefer to try something else due to the amount of hair loss she has experienced.     Dr Salinas-- see above and advise.

## 2025-05-27 NOTE — TELEPHONE ENCOUNTER
Patient called office, states she is taking methotrexate and has noticed her hair is falling out. Taking 1mg folic acid daily.

## 2025-05-28 NOTE — TELEPHONE ENCOUNTER
Called patient, KATHERYN for her that it is okay to decrease methotrexate to 3 pills weekly. But I would increase the Enbrel to weekly especially if she is going out of town. A bit risky to mess around with the medications right before travel. Advised to call our office with questions.

## 2025-05-28 NOTE — TELEPHONE ENCOUNTER
patient returned our call, explained that it is okay to decrease methotrexate to 3 pills weekly. But I would increase the Enbrel to weekly especially if she is going out of town. A bit risky to mess around with the medications right before travel. Patient voiced understanding.

## 2025-05-28 NOTE — TELEPHONE ENCOUNTER
Patient called office with additional questions regarding methotrexate. Reviewed our messages from yesterday and Dr. Salinas recommendations   -decrease methotrexate to 6 pills weekly. Start leucovorin the day after methotrexate OR     Ok to try stopping methotrexate and increase Enbrel to weekly dosing        Patient voiced understanding, and would like to decrease the methotrexate to 3 tabs once a week if Dr. Salinas is okay with this. Patient is going out of town, and worried about having increased pain.

## 2025-05-28 NOTE — TELEPHONE ENCOUNTER
That is fine to decrease methotrexate to 3 pills weekly.  But I would increase the Enbrel to weekly especially if she is going out of town.  A bit risky to mess around with the medications right before travel

## 2025-06-02 RX ORDER — MEDROXYPROGESTERONE ACETATE 150 MG/ML
INJECTION, SUSPENSION INTRAMUSCULAR
Qty: 4 ML | Refills: 5 | Status: SHIPPED | OUTPATIENT
Start: 2025-06-02

## 2025-06-02 NOTE — TELEPHONE ENCOUNTER
Enbrel sureclick 50 mg auto injector      Last office visit: 5/22/2025    Next Rheum Apt:10/2/2025 Virginia Salinas MD    Last fill: 6/12/2024 4 mL, 5 refills    Labs:   Lab Results   Component Value Date    CREATSERUM 0.63 03/19/2025    ALKPHO 85 03/19/2025    AST 23 03/19/2025    ALT 24 03/19/2025    BILT 0.8 03/19/2025    TP 7.6 03/19/2025    ALB 4.5 03/19/2025       Lab Results   Component Value Date    WBC 5.6 03/19/2025    HGB 11.8 (L) 03/19/2025    .0 03/19/2025    NEPRELIM 1.53 03/19/2025    NEPERCENT 27.2 03/19/2025    LYPERCENT 60.2 03/19/2025    NE 1.53 03/19/2025    LYMABS 3.39 03/19/2025        This document is complete and the patient is ready for discharge.

## 2025-06-06 DIAGNOSIS — Z79.899 IMMUNODEFICIENCY DUE TO DRUG THERAPY (HCC): ICD-10-CM

## 2025-06-06 DIAGNOSIS — M54.50 LOW BACK PAIN, UNSPECIFIED BACK PAIN LATERALITY, UNSPECIFIED CHRONICITY, UNSPECIFIED WHETHER SCIATICA PRESENT: ICD-10-CM

## 2025-06-06 DIAGNOSIS — M05.79 RHEUMATOID ARTHRITIS INVOLVING MULTIPLE SITES WITH POSITIVE RHEUMATOID FACTOR (HCC): ICD-10-CM

## 2025-06-06 DIAGNOSIS — R79.0 LOW FERRITIN: ICD-10-CM

## 2025-06-06 DIAGNOSIS — D84.821 IMMUNODEFICIENCY DUE TO DRUG THERAPY (HCC): ICD-10-CM

## 2025-06-09 RX ORDER — METHOTREXATE 2.5 MG/1
20 TABLET ORAL WEEKLY
Qty: 104 TABLET | Refills: 0 | Status: SHIPPED | OUTPATIENT
Start: 2025-06-09 | End: 2025-09-08

## 2025-06-09 NOTE — TELEPHONE ENCOUNTER
Methotrexate 8 tabs weekly      Last office visit: 5/22/2025    Next Rheum Apt:10/2/2025 Virginia Salinas MD    Last fill: 4/24/2025 104 tab, 0 refills    Labs: Repeat blood work in early July 2025 and in early October 2025   Lab Results   Component Value Date    CREATSERUM 0.63 03/19/2025    ALKPHO 85 03/19/2025    AST 23 03/19/2025    ALT 24 03/19/2025    BILT 0.8 03/19/2025    TP 7.6 03/19/2025    ALB 4.5 03/19/2025       Lab Results   Component Value Date    WBC 5.6 03/19/2025    HGB 11.8 (L) 03/19/2025    .0 03/19/2025    NEPRELIM 1.53 03/19/2025    NEPERCENT 27.2 03/19/2025    LYPERCENT 60.2 03/19/2025    NE 1.53 03/19/2025    LYMABS 3.39 03/19/2025        RW/needed assist/needs device

## 2025-07-23 DIAGNOSIS — Z51.81 THERAPEUTIC DRUG MONITORING: ICD-10-CM

## 2025-07-23 DIAGNOSIS — M05.79 RHEUMATOID ARTHRITIS INVOLVING MULTIPLE SITES WITH POSITIVE RHEUMATOID FACTOR (HCC): Primary | ICD-10-CM

## 2025-07-23 RX ORDER — FOLIC ACID 1 MG/1
1 TABLET ORAL DAILY
Qty: 90 TABLET | Refills: 3 | Status: SHIPPED | OUTPATIENT
Start: 2025-07-23

## 2025-07-23 NOTE — TELEPHONE ENCOUNTER
Last office visit: 5/22/2025    Next Rheum Apt:10/2/2025 Virginia Salinas MD    Last fill: folic acid  4/27/2025  90 tablets    Labs:   Lab Results   Component Value Date    CREATSERUM 0.63 03/19/2025    ALKPHO 85 03/19/2025    AST 23 03/19/2025    ALT 24 03/19/2025    BILT 0.8 03/19/2025    TP 7.6 03/19/2025    ALB 4.5 03/19/2025       Lab Results   Component Value Date    WBC 5.6 03/19/2025    HGB 11.8 (L) 03/19/2025    .0 03/19/2025    NEPRELIM 1.53 03/19/2025    NEPERCENT 27.2 03/19/2025    LYPERCENT 60.2 03/19/2025    NE 1.53 03/19/2025    LYMABS 3.39 03/19/2025

## 2025-07-29 ENCOUNTER — TELEPHONE (OUTPATIENT)
Dept: FAMILY MEDICINE CLINIC | Facility: CLINIC | Age: 55
End: 2025-07-29

## 2025-07-29 DIAGNOSIS — M05.79 RHEUMATOID ARTHRITIS INVOLVING MULTIPLE SITES WITH POSITIVE RHEUMATOID FACTOR (HCC): Primary | ICD-10-CM

## 2025-07-29 RX ORDER — LEUCOVORIN CALCIUM 5 MG/1
5 TABLET ORAL WEEKLY
Qty: 13 TABLET | Refills: 3 | Status: SHIPPED | OUTPATIENT
Start: 2025-07-29

## 2025-08-01 ENCOUNTER — PATIENT MESSAGE (OUTPATIENT)
Dept: ADMINISTRATIVE | Age: 55
End: 2025-08-01

## 2025-08-01 ENCOUNTER — TELEPHONE (OUTPATIENT)
Dept: RHEUMATOLOGY | Facility: CLINIC | Age: 55
End: 2025-08-01

## 2025-08-04 ENCOUNTER — TELEPHONE (OUTPATIENT)
Dept: RHEUMATOLOGY | Facility: CLINIC | Age: 55
End: 2025-08-04

## 2025-08-11 ENCOUNTER — PATIENT MESSAGE (OUTPATIENT)
Dept: FAMILY MEDICINE CLINIC | Facility: CLINIC | Age: 55
End: 2025-08-11

## 2025-08-11 PROBLEM — F41.9 ANXIETY: Status: ACTIVE | Noted: 2025-08-11

## 2025-08-12 ENCOUNTER — TELEPHONE (OUTPATIENT)
Dept: FAMILY MEDICINE CLINIC | Facility: CLINIC | Age: 55
End: 2025-08-12

## 2025-08-12 RX ORDER — FLUOXETINE 10 MG/1
10 CAPSULE ORAL DAILY
Qty: 30 CAPSULE | Refills: 0 | Status: SHIPPED | OUTPATIENT
Start: 2025-08-12

## (undated) NOTE — LETTER
2024    Patient Name: Paz Ocampo  : 1970        To Whom it may concern:     This letter has been written at the patient's request. The above patient is being followed in my clinic for a chronic medical condition.      Recommend limiting lifting to a maximum of 25 pounds, and provide sitting accommodations when needed.      Sincerely,

## (undated) NOTE — LETTER
2025  Paz Ocampo  [address] [facility] [address]  :1970       To Whom It May Concern,    I am writing you on behalf of my patient Paz Ocampo. Due to her underlying medical condition, she is restricted to lifting no more than 20 pounds. When she is experiencing pain to her hands, please excuse her from performing her usual duties.       Sincerely,          Virginia Salinas MD  [address] [place] [id number]  Ph: [phone]  Fax: [phone]